# Patient Record
Sex: FEMALE | Race: WHITE | NOT HISPANIC OR LATINO | Employment: FULL TIME | URBAN - METROPOLITAN AREA
[De-identification: names, ages, dates, MRNs, and addresses within clinical notes are randomized per-mention and may not be internally consistent; named-entity substitution may affect disease eponyms.]

---

## 2017-08-09 ENCOUNTER — HISTORICAL (OUTPATIENT)
Dept: RADIOLOGY | Facility: HOSPITAL | Age: 47
End: 2017-08-09

## 2017-08-09 LAB
ALBUMIN SERPL-MCNC: 3.6 GM/DL (ref 3.4–5)
ALP SERPL-CCNC: 71 UNIT/L (ref 46–116)
ALT SERPL-CCNC: 61 UNIT/L (ref 12–78)
AST SERPL-CCNC: 36 UNIT/L (ref 15–37)
BILIRUB SERPL-MCNC: 1 MG/DL (ref 0.2–1)
BILIRUBIN DIRECT+TOT PNL SERPL-MCNC: 0.19 MG/DL (ref 0–0.2)
BILIRUBIN DIRECT+TOT PNL SERPL-MCNC: 0.81 MG/DL (ref 0–0.8)
BUN SERPL-MCNC: 17 MG/DL (ref 7–18)
CALCIUM SERPL-MCNC: 8.8 MG/DL (ref 8.5–10.1)
CHLORIDE SERPL-SCNC: 105 MMOL/L (ref 98–107)
CHOLEST SERPL-MCNC: 190 MG/DL (ref 0–200)
CHOLEST/HDLC SERPL: 3.5 {RATIO} (ref 0–4)
CO2 SERPL-SCNC: 23.8 MMOL/L (ref 21–32)
CREAT SERPL-MCNC: 0.91 MG/DL (ref 0.6–1.3)
DEPRECATED CALCIDIOL+CALCIFEROL SERPL-MC: 43.53 NG/ML (ref 30–80)
ERYTHROCYTE [DISTWIDTH] IN BLOOD BY AUTOMATED COUNT: 14 % (ref 11.5–17)
EST. AVERAGE GLUCOSE BLD GHB EST-MCNC: 128 MG/DL
FT4I SERPL CALC-MCNC: 2.96
GLUCOSE SERPL-MCNC: 109 MG/DL (ref 74–106)
HBA1C MFR BLD: 6.1 % (ref 4.5–6.2)
HCT VFR BLD AUTO: 36.2 % (ref 37–47)
HDLC SERPL-MCNC: 54 MG/DL (ref 40–60)
HGB BLD-MCNC: 11.9 GM/DL (ref 12–16)
LDLC SERPL CALC-MCNC: 103 MG/DL (ref 0–129)
MCH RBC QN AUTO: 29.4 PG (ref 27–31)
MCHC RBC AUTO-ENTMCNC: 32.9 GM/DL (ref 33–36)
MCV RBC AUTO: 89.3 FL (ref 80–94)
PLATELET # BLD AUTO: 199 X10(3)/MCL (ref 130–400)
PMV BLD AUTO: 10.2 FL (ref 7.4–10.4)
POTASSIUM SERPL-SCNC: 4.3 MMOL/L (ref 3.5–5.1)
PROT SERPL-MCNC: 6.8 GM/DL (ref 6.4–8.2)
RBC # BLD AUTO: 4.05 X10(6)/MCL (ref 4.2–5.4)
SODIUM SERPL-SCNC: 139 MMOL/L (ref 136–145)
T3RU NFR SERPL: 34 % (ref 31–39)
T4 SERPL-MCNC: 8.7 MCG/DL (ref 4.7–13.3)
TRIGL SERPL-MCNC: 163 MG/DL
TSH SERPL-ACNC: 2.23 MIU/ML (ref 0.36–3.74)
VLDLC SERPL CALC-MCNC: 33 MG/DL
WBC # SPEC AUTO: 11.9 X10(3)/MCL (ref 4.5–11.5)

## 2018-08-18 ENCOUNTER — HISTORICAL (OUTPATIENT)
Dept: LAB | Facility: HOSPITAL | Age: 48
End: 2018-08-18

## 2018-08-18 LAB
ABS NEUT (OLG): 5.98 X10(3)/MCL (ref 2.1–9.2)
ALBUMIN SERPL-MCNC: 3.5 GM/DL (ref 3.4–5)
ALP SERPL-CCNC: 63 UNIT/L (ref 46–116)
ALT SERPL-CCNC: 103 UNIT/L (ref 12–78)
AST SERPL-CCNC: 72 UNIT/L (ref 15–37)
BASOPHILS # BLD AUTO: 0 X10(3)/MCL (ref 0–0.2)
BASOPHILS NFR BLD AUTO: 0 %
BILIRUB SERPL-MCNC: 0.9 MG/DL (ref 0.2–1)
BILIRUBIN DIRECT+TOT PNL SERPL-MCNC: 0.18 MG/DL (ref 0–0.2)
BILIRUBIN DIRECT+TOT PNL SERPL-MCNC: 0.72 MG/DL (ref 0–0.8)
BUN SERPL-MCNC: 14.3 MG/DL (ref 7–18)
CALCIUM SERPL-MCNC: 8.9 MG/DL (ref 8.5–10.1)
CHLORIDE SERPL-SCNC: 105 MMOL/L (ref 98–107)
CHOLEST SERPL-MCNC: 180 MG/DL (ref 0–200)
CHOLEST/HDLC SERPL: 3.3 {RATIO} (ref 0–4)
CO2 SERPL-SCNC: 22.8 MMOL/L (ref 21–32)
CREAT SERPL-MCNC: 0.79 MG/DL (ref 0.6–1.3)
CREAT/UREA NIT SERPL: 18
DEPRECATED CALCIDIOL+CALCIFEROL SERPL-MC: 62.82 NG/ML (ref 30–80)
EOSINOPHIL # BLD AUTO: 0.1 X10(3)/MCL (ref 0–0.9)
EOSINOPHIL NFR BLD AUTO: 1 %
ERYTHROCYTE [DISTWIDTH] IN BLOOD BY AUTOMATED COUNT: 14 % (ref 11.5–17)
EST. AVERAGE GLUCOSE BLD GHB EST-MCNC: 123 MG/DL
FT4I SERPL CALC-MCNC: 2.55
GLUCOSE SERPL-MCNC: 118 MG/DL (ref 74–106)
HBA1C MFR BLD: 5.9 % (ref 4.5–6.2)
HCT VFR BLD AUTO: 37.1 % (ref 37–47)
HDLC SERPL-MCNC: 55 MG/DL (ref 40–60)
HGB BLD-MCNC: 11.8 GM/DL (ref 12–16)
IMM GRANULOCYTES # BLD AUTO: 0.03 % (ref 0–0.02)
IMM GRANULOCYTES NFR BLD AUTO: 0.3 % (ref 0–0.43)
LDLC SERPL CALC-MCNC: 103 MG/DL (ref 0–129)
LYMPHOCYTES # BLD AUTO: 2.5 X10(3)/MCL (ref 0.6–4.6)
LYMPHOCYTES NFR BLD AUTO: 28 %
MCH RBC QN AUTO: 28.6 PG (ref 27–31)
MCHC RBC AUTO-ENTMCNC: 31.8 GM/DL (ref 33–36)
MCV RBC AUTO: 90 FL (ref 80–94)
MONOCYTES # BLD AUTO: 0.4 X10(3)/MCL (ref 0.1–1.3)
MONOCYTES NFR BLD AUTO: 4 %
NEUTROPHILS # BLD AUTO: 5.98 X10(3)/MCL (ref 1.4–7.9)
NEUTROPHILS NFR BLD AUTO: 66 %
PLATELET # BLD AUTO: 235 X10(3)/MCL (ref 130–400)
PMV BLD AUTO: 11.2 FL (ref 9.4–12.4)
POTASSIUM SERPL-SCNC: 4.5 MMOL/L (ref 3.5–5.1)
PROT SERPL-MCNC: 6.8 GM/DL (ref 6.4–8.2)
RBC # BLD AUTO: 4.12 X10(6)/MCL (ref 4.2–5.4)
SODIUM SERPL-SCNC: 140 MMOL/L (ref 136–145)
T3RU NFR SERPL: 29 % (ref 31–39)
T4 SERPL-MCNC: 8.8 MCG/DL (ref 4.7–13.3)
TRIGL SERPL-MCNC: 111 MG/DL
TSH SERPL-ACNC: 0.81 MIU/ML (ref 0.36–3.74)
VLDLC SERPL CALC-MCNC: 22 MG/DL
WBC # SPEC AUTO: 9 X10(3)/MCL (ref 4.5–11.5)

## 2018-08-27 ENCOUNTER — HISTORICAL (OUTPATIENT)
Dept: RADIOLOGY | Facility: HOSPITAL | Age: 48
End: 2018-08-27

## 2018-08-27 LAB
HAV IGM SERPL QL IA: NEGATIVE
HBV CORE IGM SERPL QL IA: NEGATIVE
HBV SURFACE AG SERPL QL IA: NEGATIVE
HCV AB SERPL QL IA: NEGATIVE
HEPATITIS PANEL INTERP: NORMAL

## 2018-09-10 ENCOUNTER — HISTORICAL (OUTPATIENT)
Dept: RADIOLOGY | Facility: HOSPITAL | Age: 48
End: 2018-09-10

## 2018-11-01 ENCOUNTER — HISTORICAL (OUTPATIENT)
Dept: LAB | Facility: HOSPITAL | Age: 48
End: 2018-11-01

## 2018-11-01 LAB
ALBUMIN SERPL-MCNC: 3.5 GM/DL (ref 3.4–5)
ALP SERPL-CCNC: 65 UNIT/L (ref 46–116)
ALT SERPL-CCNC: 75 UNIT/L (ref 12–78)
AST SERPL-CCNC: 46 UNIT/L (ref 15–37)
BILIRUB SERPL-MCNC: 0.9 MG/DL (ref 0.2–1)
BILIRUBIN DIRECT+TOT PNL SERPL-MCNC: 0.21 MG/DL (ref 0–0.2)
BILIRUBIN DIRECT+TOT PNL SERPL-MCNC: 0.69 MG/DL (ref 0–0.8)
PROT SERPL-MCNC: 6.7 GM/DL (ref 6.4–8.2)

## 2018-12-19 ENCOUNTER — HISTORICAL (OUTPATIENT)
Dept: ENDOSCOPY | Facility: HOSPITAL | Age: 48
End: 2018-12-19

## 2018-12-19 LAB — CRC RECOMMENDATION EXT: NORMAL

## 2019-08-31 ENCOUNTER — HISTORICAL (OUTPATIENT)
Dept: LAB | Facility: HOSPITAL | Age: 49
End: 2019-08-31

## 2019-08-31 LAB
ABS NEUT (OLG): 7.14 X10(3)/MCL (ref 2.1–9.2)
ALBUMIN SERPL-MCNC: 3.7 GM/DL (ref 3.4–5)
ALP SERPL-CCNC: 74 UNIT/L (ref 46–116)
ALT SERPL-CCNC: 73 UNIT/L (ref 12–78)
AST SERPL-CCNC: 51 UNIT/L (ref 15–37)
BASOPHILS # BLD AUTO: 0 X10(3)/MCL (ref 0–0.2)
BASOPHILS NFR BLD AUTO: 0 %
BILIRUB SERPL-MCNC: 1.2 MG/DL (ref 0.2–1)
BILIRUBIN DIRECT+TOT PNL SERPL-MCNC: 0.25 MG/DL (ref 0–0.2)
BILIRUBIN DIRECT+TOT PNL SERPL-MCNC: 0.95 MG/DL (ref 0–0.8)
BUN SERPL-MCNC: 13.7 MG/DL (ref 7–18)
CALCIUM SERPL-MCNC: 9.3 MG/DL (ref 8.5–10.1)
CHLORIDE SERPL-SCNC: 106 MMOL/L (ref 98–107)
CHOLEST SERPL-MCNC: 180 MG/DL (ref 0–200)
CHOLEST/HDLC SERPL: 3.3 {RATIO} (ref 0–4)
CO2 SERPL-SCNC: 28.7 MMOL/L (ref 21–32)
CREAT SERPL-MCNC: 0.86 MG/DL (ref 0.6–1.3)
CREAT/UREA NIT SERPL: 16
DEPRECATED CALCIDIOL+CALCIFEROL SERPL-MC: 84.83 NG/ML (ref 30–80)
EOSINOPHIL # BLD AUTO: 0.1 X10(3)/MCL (ref 0–0.9)
EOSINOPHIL NFR BLD AUTO: 1 %
ERYTHROCYTE [DISTWIDTH] IN BLOOD BY AUTOMATED COUNT: 13.8 % (ref 11.5–17)
EST. AVERAGE GLUCOSE BLD GHB EST-MCNC: 137 MG/DL
FT4I SERPL CALC-MCNC: 3.33
GLUCOSE SERPL-MCNC: 120 MG/DL (ref 74–106)
HBA1C MFR BLD: 6.4 % (ref 4.5–6.2)
HCT VFR BLD AUTO: 39.2 % (ref 37–47)
HDLC SERPL-MCNC: 54 MG/DL (ref 40–60)
HGB BLD-MCNC: 12.4 GM/DL (ref 12–16)
IMM GRANULOCYTES # BLD AUTO: 0.01 % (ref 0–0.02)
IMM GRANULOCYTES NFR BLD AUTO: 0.1 % (ref 0–0.43)
LDLC SERPL CALC-MCNC: 108 MG/DL (ref 0–129)
LYMPHOCYTES # BLD AUTO: 2.7 X10(3)/MCL (ref 0.6–4.6)
LYMPHOCYTES NFR BLD AUTO: 26 %
MCH RBC QN AUTO: 28.8 PG (ref 27–31)
MCHC RBC AUTO-ENTMCNC: 31.6 GM/DL (ref 33–36)
MCV RBC AUTO: 91.2 FL (ref 80–94)
MONOCYTES # BLD AUTO: 0.4 X10(3)/MCL (ref 0.1–1.3)
MONOCYTES NFR BLD AUTO: 4 %
NEUTROPHILS # BLD AUTO: 7.14 X10(3)/MCL (ref 1.4–7.9)
NEUTROPHILS NFR BLD AUTO: 69 %
PLATELET # BLD AUTO: 247 X10(3)/MCL (ref 130–400)
PMV BLD AUTO: 11.3 FL (ref 9.4–12.4)
POTASSIUM SERPL-SCNC: 4.7 MMOL/L (ref 3.5–5.1)
PROT SERPL-MCNC: 7.2 GM/DL (ref 6.4–8.2)
RBC # BLD AUTO: 4.3 X10(6)/MCL (ref 4.2–5.4)
SODIUM SERPL-SCNC: 142 MMOL/L (ref 136–145)
T3RU NFR SERPL: 34 % (ref 31–39)
T4 SERPL-MCNC: 9.8 MCG/DL (ref 4.7–13.3)
TRIGL SERPL-MCNC: 89 MG/DL
TSH SERPL-ACNC: 1.25 MIU/ML (ref 0.36–3.74)
VLDLC SERPL CALC-MCNC: 18 MG/DL
WBC # SPEC AUTO: 10.4 X10(3)/MCL (ref 4.5–11.5)

## 2019-09-13 ENCOUNTER — HISTORICAL (OUTPATIENT)
Dept: RADIOLOGY | Facility: HOSPITAL | Age: 49
End: 2019-09-13

## 2020-09-09 ENCOUNTER — HISTORICAL (OUTPATIENT)
Dept: LAB | Facility: HOSPITAL | Age: 50
End: 2020-09-09

## 2020-09-09 LAB
ABS NEUT (OLG): 7.35 X10(3)/MCL (ref 2.1–9.2)
ALBUMIN SERPL-MCNC: 3.8 GM/DL (ref 3.4–5)
ALP SERPL-CCNC: 61 UNIT/L (ref 46–116)
ALT SERPL-CCNC: 75 UNIT/L (ref 12–78)
AST SERPL-CCNC: 46 UNIT/L (ref 15–37)
BASOPHILS # BLD AUTO: 0 X10(3)/MCL (ref 0–0.2)
BASOPHILS NFR BLD AUTO: 0 %
BILIRUB SERPL-MCNC: 1 MG/DL (ref 0.2–1)
BILIRUBIN DIRECT+TOT PNL SERPL-MCNC: 0.28 MG/DL (ref 0–0.2)
BILIRUBIN DIRECT+TOT PNL SERPL-MCNC: 0.72 MG/DL (ref 0–0.8)
BUN SERPL-MCNC: 17.6 MG/DL (ref 7–18)
CALCIUM SERPL-MCNC: 9.7 MG/DL (ref 8.5–10.1)
CHLORIDE SERPL-SCNC: 106 MMOL/L (ref 98–107)
CHOLEST SERPL-MCNC: 190 MG/DL (ref 0–200)
CHOLEST/HDLC SERPL: 3.2 {RATIO} (ref 0–4)
CO2 SERPL-SCNC: 25.8 MMOL/L (ref 21–32)
CREAT SERPL-MCNC: 0.82 MG/DL (ref 0.6–1.3)
CREAT/UREA NIT SERPL: 21 MG/DL (ref 12–14)
DEPRECATED CALCIDIOL+CALCIFEROL SERPL-MC: 49.1 NG/ML (ref 6.6–49.9)
EOSINOPHIL # BLD AUTO: 0.2 X10(3)/MCL (ref 0–0.9)
EOSINOPHIL NFR BLD AUTO: 2 %
ERYTHROCYTE [DISTWIDTH] IN BLOOD BY AUTOMATED COUNT: 14.1 % (ref 11.5–17)
EST. AVERAGE GLUCOSE BLD GHB EST-MCNC: 140 MG/DL
FT4I SERPL CALC-MCNC: 2.76
GLUCOSE SERPL-MCNC: 134 MG/DL (ref 74–106)
HBA1C MFR BLD: 6.5 % (ref 4.5–6.2)
HCT VFR BLD AUTO: 39.4 % (ref 37–47)
HDLC SERPL-MCNC: 59 MG/DL (ref 40–60)
HGB BLD-MCNC: 12.5 GM/DL (ref 12–16)
IMM GRANULOCYTES # BLD AUTO: 0.02 % (ref 0–0.02)
IMM GRANULOCYTES NFR BLD AUTO: 0.2 % (ref 0–0.43)
LDLC SERPL CALC-MCNC: 109 MG/DL (ref 0–129)
LYMPHOCYTES # BLD AUTO: 2.7 X10(3)/MCL (ref 0.6–4.6)
LYMPHOCYTES NFR BLD AUTO: 25 %
MCH RBC QN AUTO: 29.2 PG (ref 27–31)
MCHC RBC AUTO-ENTMCNC: 31.7 GM/DL (ref 33–36)
MCV RBC AUTO: 92.1 FL (ref 80–94)
MONOCYTES # BLD AUTO: 0.5 X10(3)/MCL (ref 0.1–1.3)
MONOCYTES NFR BLD AUTO: 5 %
NEUTROPHILS # BLD AUTO: 7.35 X10(3)/MCL (ref 1.4–7.9)
NEUTROPHILS NFR BLD AUTO: 68 %
PLATELET # BLD AUTO: 234 X10(3)/MCL (ref 130–400)
PMV BLD AUTO: 12 FL (ref 9.4–12.4)
POTASSIUM SERPL-SCNC: 4.7 MMOL/L (ref 3.5–5.1)
PROT SERPL-MCNC: 7.1 GM/DL (ref 6.4–8.2)
RBC # BLD AUTO: 4.28 X10(6)/MCL (ref 4.2–5.4)
SODIUM SERPL-SCNC: 140 MMOL/L (ref 136–145)
T3RU NFR SERPL: 31 % (ref 31–39)
T4 SERPL-MCNC: 8.9 MCG/DL (ref 4.7–13.3)
TRIGL SERPL-MCNC: 111 MG/DL
TSH SERPL-ACNC: 1.81 MIU/ML (ref 0.36–3.74)
VLDLC SERPL CALC-MCNC: 22 MG/DL
WBC # SPEC AUTO: 10.8 X10(3)/MCL (ref 4.5–11.5)

## 2020-09-23 ENCOUNTER — HISTORICAL (OUTPATIENT)
Dept: RADIOLOGY | Facility: HOSPITAL | Age: 50
End: 2020-09-23

## 2020-10-08 LAB
PAP RECOMMENDATION EXT: NORMAL
PAP SMEAR: NORMAL

## 2021-10-04 LAB
PAP RECOMMENDATION EXT: NORMAL
PAP SMEAR: NORMAL

## 2021-10-06 ENCOUNTER — HISTORICAL (OUTPATIENT)
Dept: RADIOLOGY | Facility: HOSPITAL | Age: 51
End: 2021-10-06

## 2022-04-11 ENCOUNTER — HISTORICAL (OUTPATIENT)
Dept: ADMINISTRATIVE | Facility: HOSPITAL | Age: 52
End: 2022-04-11
Payer: COMMERCIAL

## 2022-04-24 VITALS
DIASTOLIC BLOOD PRESSURE: 86 MMHG | SYSTOLIC BLOOD PRESSURE: 140 MMHG | BODY MASS INDEX: 50.02 KG/M2 | WEIGHT: 293 LBS | HEIGHT: 64 IN

## 2022-04-30 NOTE — H&P
Patient:   Hortensia Elena            MRN: [id number]            FIN: [id number]               Age:   48 years     Sex:  Female     :  1970   Associated Diagnoses:   None   Author:   Chris Petersen MD      Basic Information   Source of history:  Self.       Chief Complaint   48-year-old female referred for evaluation for screening colonoscopy.  She denies any typical problems of diarrhea, constipation, abdominal pain, weight loss, rectal bleeding or other alarm symptoms.  She denies any family members with colon cancer colon polyps.  She tolerated the prep is ready for the procedure.      Review of Systems   Constitutional:  Negative.    Gastrointestinal:  Negative.    Musculoskeletal:  Negative.       Health Status   Allergies:    Allergic Reactions (Selected)  No Known Medication Allergies,    Allergies (1) Active Reaction  No Known Medication Allergies None Documented   Current medications:  (Selected)   Documented Medications  Documented  Benicar 20 mg oral tablet: 20 mg = 1 tab(s), Oral, Daily  Vitamin D 50,000 intl units oral capsule: 50,000 IntUnit = 1 cap(s), Oral, 2x/Wk, 0 Refill(s)  Zyrtec 10 mg oral tablet: 10 mg = 1 tab(s), Oral, Daily  pravastatin 20 mg oral tablet: 20 mg = 1 tab(s), Oral, At Bedtime   Problem list:    All Problems  Able to lie down / SNOMED CT 154938648 / Confirmed  STATES ABLE TO LIE FLAT ON BACK WITH NO SOB  Able to walk / SNOMED CT 303093029 / Confirmed  PATIENT STATES NOT ABLE TO WALK 2 BLOCKS BRISKLY DUE TO KNEE  CPAP (continuous positive airway pressure) ventilation weaning protocol / SNOMED CT 1181933657 / Confirmed  Hypercholesterolaemia / SNOMED CT 489839724 / Confirmed  Hyperlipidemia / SNOMED CT 69813811 / Confirmed  Hypertension / SNOMED CT 41402932 / Confirmed  SAW DR BARCLAY LAST WEEK WITH EKG,ULTRASOUND,ECHO  Sleep apnea / SNOMED CT 114615312 / Confirmed,    Active Problems (7)  Able to lie down   Able to walk   CPAP (continuous positive airway  pressure) ventilation weaning protocol   Hypercholesterolaemia   Hyperlipidemia   Hypertension   Sleep apnea       Histories   Past Medical History:    Active  Hypertension (30748136)  Comments:  12/20/2015 CST 12:37 Eloisa Giordano RN  SAW DR BARCLAY LAST WEEK WITH EKG,ULTRASOUND,ECHO  Hyperlipidemia (69982877)  Able to lie down (296484004)  Comments:  12/20/2015 CST 12:38 Eloisa Giordano RN  STATES ABLE TO LIE FLAT ON BACK WITH NO SOB  Able to walk (178724804)  Comments:  12/20/2015 CST 12:39 Eloisa Giordano RN  PATIENT STATES NOT ABLE TO WALK 2 BLOCKS BRISKLY DUE TO KNEE  Sleep apnea (153161982)  CPAP (continuous positive airway pressure) ventilation weaning protocol (9391311510)  Resolved  Subluxation of patella ([id number]):  Resolved.   Family History:    Heart disease  Mother  Stroke  Father  Hypertension.  Mother  Hyperlipidemia.  Mother  Ovarian cancer.  Grandmother  Glaucoma.  Mother     Procedure history:    Arthroscopy Knee (Right) on 12/22/2015 at 45 Years.  Comments:  12/22/2015 08:16 - Lejeune RN, Doralis Marie  auto-populated from documented surgical case  Cholecystectomy ([id number]).  Bilateral tubal ligation (696492594).  Ablation ([id number]).  Comments:  12/22/2015 06:06 - Clarisse Khna RN  uterus   Social History        Social & Psychosocial Habits    Alcohol  12/20/2015  Use: Current    Type: Beer    Comment: OCCASIONALLY - 12/20/2015 12:47 - Eloisa Gomez RN    Employment/School  12/20/2015  Status: Employed    Highest education: High school    Home/Environment  12/20/2015  Lives with: Spouse    Living situation: Home/Independent    Home equipment: CPAP/BiPAP    Family/Friends available to help: Yes    Nutrition/Health  12/20/2015  Type of diet: Regular    Substance Abuse  12/20/2015 Risk Assessment: Denies Substance Abuse    Tobacco  12/20/2015  Use: Former smoker    Started at age: 18.0 Years    Comment: QUIT 1 YEAR AGO - 12/20/2015 12:47  - Jason BOO, Eloisa LIRA    12/19/2018  Use: Former smoker    Patient Wants Consult For Cessation Counseling No.        Physical Examination   General:  Alert and oriented.    Respiratory:  Lungs are clear to auscultation.    Gastrointestinal:  Soft, Non-tender, Non-distended.       Vital Signs (last 24 hrs)_____  Last Charted___________  Resp Rate         17 br/min  (DEC 19 07:20)  SBP      H 173mmHg  (DEC 19 07:20)  DBP      84 mmHg  (DEC 19 07:20)  SpO2      97 %  (DEC 19 07:20)   Measurements from flowsheet : Measurements   12/19/2018 7:26 CST      Weight Dosing             137.9 kg                             Weight Measured and Calculated in Lbs     304.01 lb                             Height/Length Dosing      160 cm        Health Maintenance      Health Maintenance     Pending (in the next year)        OverDue           Diabetes Screening due  and every         Due            ADL Screening due  12/19/18  and every 1  year(s)           Alcohol Misuse Screening due  12/19/18  and every 1  year(s)           Colorectal Screening due  12/19/18  and every            Depression Screening due  12/19/18  and every            Hypertension Management-Education due  12/19/18  and every 1  year(s)           Influenza Vaccine due  12/19/18  and every            Smoking Cessation due  12/19/18  Variable frequency           Tetanus Vaccine due  12/19/18  and every 10  year(s)        Due In Future            Blood Pressure Screening not due until  04/17/19  and every 1  year(s)           Body Mass Index Check not due until  04/17/19  and every 1  year(s)           Hypertension Management-Blood Pressure not due until  04/17/19  and every 1  year(s)           Obesity Screening not due until  04/17/19  and every 1  year(s)           Hypertension Management-BMP not due until  11/01/19  and every 1  year(s)     Satisfied (in the past 1 year)        Satisfied            Blood Pressure Screening on  12/19/18.  Satisfied by Janell LPN,  Yady           Body Mass Index Check on  04/17/18.  Satisfied by Lisa Cao.           Breast Cancer Screening on  08/27/18.  Satisfied by Valorie Simms           Cervical Cancer Screening on  10/17/18.  Satisfied by Lori Christianson           Diabetes Screening on  08/18/18.  Satisfied by Grayson Wooten           Hypertension Management-Blood Pressure on  12/19/18.  Satisfied by Yady Maciel LPN           Influenza Vaccine on  12/19/18.  Satisfied by Yady Maciel LPN           Lipid Screening on  08/18/18.  Satisfied by Grayson Wooten           Obesity Screening on  04/17/18.  Satisfied by Lisa Cao.        Review / Management   Results review:     No qualifying data available.       Impression and Plan   Planning for screening colons were asked to follow

## 2022-05-09 RX ORDER — PRAVASTATIN SODIUM 40 MG/1
40 TABLET ORAL DAILY
COMMUNITY
Start: 2021-04-26

## 2022-05-09 RX ORDER — CHOLECALCIFEROL (VITAMIN D3) 25 MCG
1000 TABLET ORAL DAILY
COMMUNITY
End: 2023-05-09

## 2022-05-09 RX ORDER — MELOXICAM 15 MG/1
15 TABLET ORAL DAILY
COMMUNITY
Start: 2021-04-26

## 2022-05-09 RX ORDER — LOSARTAN POTASSIUM 50 MG/1
50 TABLET ORAL DAILY
COMMUNITY
Start: 2021-04-26 | End: 2022-05-10

## 2022-05-09 RX ORDER — CETIRIZINE HYDROCHLORIDE 10 MG/1
10 TABLET ORAL DAILY
COMMUNITY

## 2022-05-10 ENCOUNTER — OFFICE VISIT (OUTPATIENT)
Dept: NEUROLOGY | Facility: CLINIC | Age: 52
End: 2022-05-10
Payer: COMMERCIAL

## 2022-05-10 VITALS
HEIGHT: 64 IN | BODY MASS INDEX: 50.02 KG/M2 | SYSTOLIC BLOOD PRESSURE: 124 MMHG | WEIGHT: 293 LBS | DIASTOLIC BLOOD PRESSURE: 80 MMHG

## 2022-05-10 DIAGNOSIS — G47.33 OSA ON CPAP: Primary | ICD-10-CM

## 2022-05-10 PROCEDURE — 99999 PR PBB SHADOW E&M-EST. PATIENT-LVL III: CPT | Mod: PBBFAC,,, | Performed by: NURSE PRACTITIONER

## 2022-05-10 PROCEDURE — 99999 PR PBB SHADOW E&M-EST. PATIENT-LVL III: ICD-10-PCS | Mod: PBBFAC,,, | Performed by: NURSE PRACTITIONER

## 2022-05-10 PROCEDURE — 99213 OFFICE O/P EST LOW 20 MIN: CPT | Mod: S$GLB,,, | Performed by: NURSE PRACTITIONER

## 2022-05-10 PROCEDURE — 99213 PR OFFICE/OUTPT VISIT, EST, LEVL III, 20-29 MIN: ICD-10-PCS | Mod: S$GLB,,, | Performed by: NURSE PRACTITIONER

## 2022-05-10 RX ORDER — LOSARTAN POTASSIUM 100 MG/1
100 TABLET ORAL DAILY
COMMUNITY
Start: 2022-04-30

## 2022-05-10 NOTE — PROGRESS NOTES
Subjective:       Patient ID: Hortensia Elena is a 51 y.o. female.    Chief Complaint: f/u jose l (Here for jose l f/u/../Pt reports nightly usage of pap machine; pap therapy beneficial for sleep. Refreshed awakenings; denies eds. Tolerating mask/pressure well. Needs supply order; dme is viemed. Has registered machine for replacement but not received. )    52 y/o female - annual FU for JOSE L      Pt reports nightly usage of pap machine; pap therapy beneficial for sleep. Refreshed awakenings; denies eds. Tolerating mask/pressure well. Needs supply order; dme is viemed. Has registered machine for replacement but not received.     PAP data:  date range 04/09/2022-05/08/2022  days used >=4hr 29/30  97%  CPAP 12 cm           Review of Systems   Constitutional: Negative for fatigue.        EPSS 3         Objective:      Physical Exam  Vitals reviewed.   Constitutional:       General: She is awake.      Appearance: Normal appearance. She is well-developed.   Eyes:      General: Lids are normal.      Extraocular Movements: Extraocular movements intact.   Cardiovascular:      Rate and Rhythm: Normal rate and regular rhythm.   Pulmonary:      Effort: Pulmonary effort is normal.      Breath sounds: Normal breath sounds.   Neurological:      General: No focal deficit present.      Mental Status: She is alert and oriented to person, place, and time.      Cranial Nerves: Cranial nerves are intact.      Motor: Motor function is intact.      Gait: Gait is intact.   Psychiatric:         Attention and Perception: Attention and perception normal.         Speech: Speech normal.         Behavior: Behavior is cooperative.         Cognition and Memory: Cognition and memory normal.         Judgment: Judgment normal.         Assessment:       Problem List Items Addressed This Visit    None     Visit Diagnoses     JOSE L on CPAP    -  Primary          Plan:           JOSE L:  Encouraged continued use of PAP. Drowsy driving may still occur despite PAP  use. Clinical follow up and replacement of supplies discussed.    FU in 1 year

## 2022-07-22 ENCOUNTER — PATIENT OUTREACH (OUTPATIENT)
Dept: ADMINISTRATIVE | Facility: HOSPITAL | Age: 52
End: 2022-07-22
Payer: COMMERCIAL

## 2022-07-22 NOTE — PROGRESS NOTES
Population Health Outreach. The following record(s)  below were uploaded for Health Maintenance .               10.08.2020 , 10.04.2021 PAP SMEAR      12.19.2018 COLONOSCOPY

## 2022-09-28 DIAGNOSIS — Z12.31 ENCOUNTER FOR SCREENING MAMMOGRAM FOR MALIGNANT NEOPLASM OF BREAST: Primary | ICD-10-CM

## 2022-10-10 ENCOUNTER — HOSPITAL ENCOUNTER (OUTPATIENT)
Dept: RADIOLOGY | Facility: HOSPITAL | Age: 52
Discharge: HOME | End: 2022-10-10
Attending: OBSTETRICS & GYNECOLOGY
Payer: COMMERCIAL

## 2022-10-10 DIAGNOSIS — Z12.31 ENCOUNTER FOR SCREENING MAMMOGRAM FOR MALIGNANT NEOPLASM OF BREAST: ICD-10-CM

## 2022-10-10 PROCEDURE — 77063 MAMMO DIGITAL SCREENING BILAT WITH TOMO: ICD-10-PCS | Mod: 26,,, | Performed by: STUDENT IN AN ORGANIZED HEALTH CARE EDUCATION/TRAINING PROGRAM

## 2022-10-10 PROCEDURE — 77067 SCR MAMMO BI INCL CAD: CPT | Mod: 26,,, | Performed by: STUDENT IN AN ORGANIZED HEALTH CARE EDUCATION/TRAINING PROGRAM

## 2022-10-10 PROCEDURE — 77067 MAMMO DIGITAL SCREENING BILAT WITH TOMO: ICD-10-PCS | Mod: 26,,, | Performed by: STUDENT IN AN ORGANIZED HEALTH CARE EDUCATION/TRAINING PROGRAM

## 2022-10-10 PROCEDURE — 77063 BREAST TOMOSYNTHESIS BI: CPT | Mod: 26,,, | Performed by: STUDENT IN AN ORGANIZED HEALTH CARE EDUCATION/TRAINING PROGRAM

## 2022-10-10 PROCEDURE — 77067 SCR MAMMO BI INCL CAD: CPT | Mod: TC

## 2023-01-31 ENCOUNTER — HOSPITAL ENCOUNTER (OUTPATIENT)
Facility: HOSPITAL | Age: 53
Discharge: HOME | End: 2023-01-31
Attending: INTERNAL MEDICINE | Admitting: INTERNAL MEDICINE
Payer: COMMERCIAL

## 2023-01-31 ENCOUNTER — ANESTHESIA EVENT (OUTPATIENT)
Dept: ENDOSCOPY | Facility: HOSPITAL | Age: 53
End: 2023-01-31
Payer: COMMERCIAL

## 2023-01-31 ENCOUNTER — ANESTHESIA (OUTPATIENT)
Dept: ENDOSCOPY | Facility: HOSPITAL | Age: 53
End: 2023-01-31
Payer: COMMERCIAL

## 2023-01-31 VITALS
OXYGEN SATURATION: 94 % | HEIGHT: 64 IN | WEIGHT: 293 LBS | RESPIRATION RATE: 21 BRPM | BODY MASS INDEX: 50.02 KG/M2 | TEMPERATURE: 98 F | DIASTOLIC BLOOD PRESSURE: 71 MMHG | SYSTOLIC BLOOD PRESSURE: 117 MMHG | HEART RATE: 67 BPM

## 2023-01-31 DIAGNOSIS — Z86.010 PERSONAL HISTORY OF COLONIC POLYPS: Primary | ICD-10-CM

## 2023-01-31 PROBLEM — Z86.0100 PERSONAL HISTORY OF COLONIC POLYPS: Status: ACTIVE | Noted: 2023-01-31

## 2023-01-31 PROCEDURE — 37000008 HC ANESTHESIA 1ST 15 MINUTES: Performed by: INTERNAL MEDICINE

## 2023-01-31 PROCEDURE — 37000009 HC ANESTHESIA EA ADD 15 MINS: Performed by: INTERNAL MEDICINE

## 2023-01-31 PROCEDURE — G0105 COLORECTAL SCRN; HI RISK IND: ICD-10-PCS | Mod: ,,, | Performed by: INTERNAL MEDICINE

## 2023-01-31 PROCEDURE — 25000003 PHARM REV CODE 250: Performed by: NURSE ANESTHETIST, CERTIFIED REGISTERED

## 2023-01-31 PROCEDURE — G0105 COLORECTAL SCRN; HI RISK IND: HCPCS | Performed by: INTERNAL MEDICINE

## 2023-01-31 PROCEDURE — G0105 COLORECTAL SCRN; HI RISK IND: HCPCS | Mod: ,,, | Performed by: INTERNAL MEDICINE

## 2023-01-31 PROCEDURE — 63600175 PHARM REV CODE 636 W HCPCS: Performed by: NURSE ANESTHETIST, CERTIFIED REGISTERED

## 2023-01-31 RX ORDER — LIDOCAINE HYDROCHLORIDE 20 MG/ML
INJECTION INTRAVENOUS
Status: DISCONTINUED | OUTPATIENT
Start: 2023-01-31 | End: 2023-01-31

## 2023-01-31 RX ORDER — PROPOFOL 10 MG/ML
VIAL (ML) INTRAVENOUS
Status: COMPLETED
Start: 2023-01-31 | End: 2023-01-31

## 2023-01-31 RX ORDER — LIDOCAINE HYDROCHLORIDE 20 MG/ML
INJECTION, SOLUTION EPIDURAL; INFILTRATION; INTRACAUDAL; PERINEURAL
Status: DISCONTINUED
Start: 2023-01-31 | End: 2023-01-31 | Stop reason: HOSPADM

## 2023-01-31 RX ORDER — GLYCOPYRROLATE 0.2 MG/ML
INJECTION INTRAMUSCULAR; INTRAVENOUS
Status: DISCONTINUED | OUTPATIENT
Start: 2023-01-31 | End: 2023-01-31

## 2023-01-31 RX ORDER — PROPOFOL 10 MG/ML
VIAL (ML) INTRAVENOUS
Status: DISCONTINUED | OUTPATIENT
Start: 2023-01-31 | End: 2023-01-31

## 2023-01-31 RX ORDER — SODIUM CHLORIDE, SODIUM GLUCONATE, SODIUM ACETATE, POTASSIUM CHLORIDE AND MAGNESIUM CHLORIDE 30; 37; 368; 526; 502 MG/100ML; MG/100ML; MG/100ML; MG/100ML; MG/100ML
INJECTION, SOLUTION INTRAVENOUS CONTINUOUS
Status: CANCELLED | OUTPATIENT
Start: 2023-01-31 | End: 2023-03-02

## 2023-01-31 RX ORDER — GLYCOPYRROLATE 0.2 MG/ML
INJECTION INTRAMUSCULAR; INTRAVENOUS
Status: COMPLETED
Start: 2023-01-31 | End: 2023-01-31

## 2023-01-31 RX ORDER — KETAMINE HYDROCHLORIDE 100 MG/ML
INJECTION, SOLUTION INTRAMUSCULAR; INTRAVENOUS
Status: DISCONTINUED | OUTPATIENT
Start: 2023-01-31 | End: 2023-01-31

## 2023-01-31 RX ORDER — LISINOPRIL 40 MG/1
50 TABLET ORAL DAILY
COMMUNITY

## 2023-01-31 RX ORDER — LIDOCAINE HYDROCHLORIDE 10 MG/ML
1 INJECTION, SOLUTION EPIDURAL; INFILTRATION; INTRACAUDAL; PERINEURAL ONCE
Status: CANCELLED | OUTPATIENT
Start: 2023-01-31 | End: 2023-01-31

## 2023-01-31 RX ADMIN — PROPOFOL 30 MG: 10 INJECTION, EMULSION INTRAVENOUS at 07:01

## 2023-01-31 RX ADMIN — PROPOFOL 10 MG: 10 INJECTION, EMULSION INTRAVENOUS at 07:01

## 2023-01-31 RX ADMIN — KETAMINE HYDROCHLORIDE 5 MG: 100 INJECTION INTRAMUSCULAR; INTRAVENOUS at 07:01

## 2023-01-31 RX ADMIN — GLYCOPYRROLATE 0.1 MG: 0.2 INJECTION INTRAMUSCULAR; INTRAVENOUS at 07:01

## 2023-01-31 RX ADMIN — KETAMINE HYDROCHLORIDE 25 MG: 100 INJECTION INTRAMUSCULAR; INTRAVENOUS at 07:01

## 2023-01-31 RX ADMIN — SODIUM CHLORIDE, SODIUM GLUCONATE, SODIUM ACETATE, POTASSIUM CHLORIDE AND MAGNESIUM CHLORIDE: 526; 502; 368; 37; 30 INJECTION, SOLUTION INTRAVENOUS at 07:01

## 2023-01-31 RX ADMIN — LIDOCAINE HYDROCHLORIDE 100 MG: 20 INJECTION, SOLUTION INTRAVENOUS at 07:01

## 2023-01-31 NOTE — TRANSFER OF CARE
"Anesthesia Transfer of Care Note    Patient: Hortensia Elena    Procedure(s) Performed: Procedure(s) (LRB):  COLON (N/A)    Patient location: GI    Anesthesia Type: general    Transport from OR: Transported from OR on room air with adequate spontaneous ventilation    Post pain: adequate analgesia    Post assessment: no apparent anesthetic complications and tolerated procedure well    Post vital signs: stable    Level of consciousness: awake, alert and oriented    Nausea/Vomiting: no nausea/vomiting    Complications: none    Transfer of care protocol was followed      Last vitals:   Visit Vitals  /61 (BP Location: Left arm, Patient Position: Lying)   Pulse 77   Temp 36.3 °C (97.3 °F) (Temporal)   Resp 17   Ht 5' 4" (1.626 m)   Wt (!) 143.8 kg (317 lb)   SpO2 95%   BMI 54.41 kg/m²     "

## 2023-01-31 NOTE — ANESTHESIA PREPROCEDURE EVALUATION
"                                                                                                             01/31/2023  Hortensia Elena is a 52 y.o.,morbidly Obese female who presents with h/o Colon Polyps(3yrs ago).  Diagnosis:   Personal history of colonic polyps [Z86.010]    She returns to [facility] OR for the noted procedure below..  Procedure:   COLON (Abdomen)      PMHx:  Other Medical History   Hypercholesteremia Sleep apnea   Hypertension Personal history of colonic polyps   Morbid Obesity      Surgical History:  POLYPECTOMY TUBAL LIGATION   CHOLECYSTECTOMY KNEE ARTHROSCOPY   COLONOSCOPY            Vital signs:  Pre Vitals     Current as of 01/31/23 0703  No BP, pulse, respiration, SpO2, or temperature recorded.  Height: 5' 4" (1.626 m) (01/30/23) Weight: 143.8 kg (317 lb) (01/30/23)   BMI: 54.4 IBW: 54.7 kg (120 lb 10.7 oz)           Lab Data:  N/A    EKG:    Pre-op Assessment    I have reviewed the Patient Summary Reports.     I have reviewed the Nursing Notes. I have reviewed the NPO Status.   I have reviewed the Medications.     Review of Systems  Anesthesia Hx:  No problems with previous Anesthesia    Social:  Non-Smoker    Hematology/Oncology:  Hematology Normal   Oncology Normal     EENT/Dental:EENT/Dental Normal   Cardiovascular:   Exercise tolerance: good Hypertension  Functional Capacity good / => 4 METS    Pulmonary:   Sleep Apnea    Renal/:  Renal/ Normal     Hepatic/GI:  Hepatic/GI Normal    Musculoskeletal:  Musculoskeletal Normal    Neurological:  Neurology Normal    Endocrine:  Endocrine Normal    Dermatological:  Skin Normal    Psych:  Psychiatric Normal           Physical Exam  General: Alert, Oriented, Well nourished and Cooperative    Airway:  Mallampati: II   Mouth Opening: Normal  TM Distance: Normal  Tongue: Normal  Neck ROM: Normal ROM    Dental:  Intact    Chest/Lungs:  Clear to auscultation, Normal Respiratory Rate    Heart:  Rate: Normal  Rhythm: Regular " Rhythm        Anesthesia Plan  Type of Anesthesia, risks & benefits discussed:    Anesthesia Type: Gen Natural Airway  Intra-op Monitoring Plan: Standard ASA Monitors  Induction:  IV  Informed Consent: Informed consent signed with the Patient and all parties understand the risks and agree with anesthesia plan.  All questions answered.   ASA Score: 3  Day of Surgery Review of History & Physical: H&P Update referred to the surgeon/provider.    Ready For Surgery From Anesthesia Perspective.     .

## 2023-01-31 NOTE — H&P
"[facility]    CC: Screening    HPI 52 y.o. female here for colorectal cancer screening. No current complaints. History of colon polyps    Past Medical History  Past Medical History:   Diagnosis Date    Hypercholesteremia     Hypertension     Personal history of colonic polyps 12/19/2018    Sleep apnea          Review of Systems  General ROS: negative for chills, fever or weight loss  Cardiovascular ROS: no chest pain or dyspnea on exertion  Gastrointestinal ROS: no abdominal pain, change in bowel habits, or black/ bloody stools    Physical Examination  /69 (BP Location: Right arm, Patient Position: Lying)   Pulse 70   Temp 97.3 °F (36.3 °C) (Temporal)   Resp 19   Ht 5' 4" (1.626 m)   Wt (!) 143.8 kg (317 lb)   SpO2 (!) 94%   BMI 54.41 kg/m²   General appearance: alert, cooperative, no distress  HENT: Normocephalic, atraumatic, neck symmetrical, no nasal discharge   Lungs: clear to auscultation bilaterally, no dullness to percussion bilaterally  Heart: regular rate and rhythm without rub; no displacement of the PMI   Abdomen: soft, non-tender; bowel sounds normoactive; no organomegaly  Extremities: extremities symmetric; no clubbing, cyanosis, or edema  Neurologic: Alert and oriented X 3, normal strength, normal coordination and gait    Labs:    Imaging:    I have personally reviewed and interpreted these images.    Assessment:   52 y.o. female here for colorectal cancer screening.  Risk of bleeding and perforation explained to patient.      Plan:  Colonoscopy      DARIN Meyer Jr., M.D.  [facility]    "

## 2023-01-31 NOTE — PROVATION PATIENT INSTRUCTIONS
Discharge Summary/Instructions after an Endoscopic Procedure  Patient Name: Hortensia Elena  Patient MRN: [id number]  Patient YOB: 1970  Tuesday, January 31, 2023  Loi Meyer MD  Dear patient,  As a result of recent federal legislation (The Federal Cures Act), you may   receive lab or pathology results from your procedure in your [facility]   account before your physician is able to contact you. Your physician or   their representative will relay the results to you with their   recommendations at their soonest availability.  Thank you,  RESTRICTIONS:  During your procedure today, you received medications for sedation.  These   medications may affect your judgment, balance and coordination.  Therefore,   for 24 hours, you have the following restrictions:   - DO NOT drive a car, operate machinery, make legal/financial decisions,   sign important papers or drink alcohol.    ACTIVITY:  Today: no heavy lifting, straining or running due to procedural   sedation/anesthesia.  The following day: return to full activity including work.  DIET:  Eat and drink normally unless instructed otherwise.     TREATMENT FOR COMMON SIDE EFFECTS:  - Mild abdominal pain, nausea, belching, bloating or excessive gas:  rest,   eat lightly and use a heating pad.  - Sore Throat: treat with throat lozenges and/or gargle with warm salt   water.  - Because air was used during the procedure, expelling large amounts of air   from your rectum or belching is normal.  - If a bowel prep was taken, you may not have a bowel movement for 1-3 days.    This is normal.  SYMPTOMS TO WATCH FOR AND REPORT TO YOUR PHYSICIAN:  1. Abdominal pain or bloating, other than gas cramps.  2. Chest pain.  3. Back pain.  4. Signs of infection such as: chills or fever occurring within 24 hours   after the procedure.  5. Rectal bleeding, which would show as bright red, maroon, or black stools.   (A tablespoon of blood from the rectum is not serious,  especially if   hemorrhoids are present.)  6. Vomiting.  7. Weakness or dizziness.  GO DIRECTLY TO THE NEAREST EMERGENCY ROOM IF YOU HAVE ANY OF THE FOLLOWING:      Difficulty breathing              Chills and/or fever over 101 F   Persistent vomiting and/or vomiting blood   Severe abdominal pain   Severe chest pain   Black, tarry stools   Bleeding- more than one tablespoon   Any other symptom or condition that you feel may need urgent attention  Your doctor recommends these additional instructions:  If any biopsies were taken, your doctors clinic will contact you in 1 to 2   weeks with any results.  - Discharge patient to home (ambulatory).   - Resume previous diet.   - Continue present medications.   - Repeat colonoscopy in 5 years for surveillance.  For questions, problems or results please call your physician - Loi Meyer MD at Work:  ( [phone].  [facility], EMERGENCY ROOM PHONE NUMBER: [phone]  IF A COMPLICATION OR EMERGENCY SITUATION ARISES AND YOU ARE UNABLE TO REACH   YOUR PHYSICIAN - GO DIRECTLY TO THE EMERGENCY ROOM.  Loi Meyer MD  1/31/2023 8:05:43 AM  This report has been verified and signed electronically.  Dear patient,  As a result of recent federal legislation (The Federal Cures Act), you may   receive lab or pathology results from your procedure in your [facility]   account before your physician is able to contact you. Your physician or   their representative will relay the results to you with their   recommendations at their soonest availability.  Thank you,  PROVATION

## 2023-01-31 NOTE — ANESTHESIA POSTPROCEDURE EVALUATION
Anesthesia Post Evaluation    Patient: Hortensia Elena    Procedure(s) Performed: Procedure(s) (LRB):  COLON (N/A)    Final Anesthesia Type: general      Patient location during evaluation: PACU  Patient participation: Yes- Able to Participate  Level of consciousness: awake and alert  Post-procedure vital signs: reviewed and stable  Pain management: adequate  Airway patency: patent    PONV status at discharge: No PONV, nausea (controlled) and vomiting (controlled)  Anesthetic complications: no      Cardiovascular status: blood pressure returned to baseline and stable  Respiratory status: unassisted  Hydration status: euvolemic  Follow-up not needed.          Vitals Value Taken Time   /71 01/31/23 0820   Temp 36.5 °C (97.7 °F) 01/31/23 0803   Pulse 67 01/31/23 0820   Resp 21 01/31/23 0820   SpO2 94 % 01/31/23 0820         No case tracking events are documented in the log.      Pain/Ravi Score: Ravi Score: 10 (1/31/2023  8:20 AM)

## 2023-05-09 ENCOUNTER — OFFICE VISIT (OUTPATIENT)
Dept: NEUROLOGY | Facility: CLINIC | Age: 53
End: 2023-05-09
Payer: COMMERCIAL

## 2023-05-09 VITALS
SYSTOLIC BLOOD PRESSURE: 118 MMHG | BODY MASS INDEX: 50.02 KG/M2 | DIASTOLIC BLOOD PRESSURE: 84 MMHG | WEIGHT: 293 LBS | HEIGHT: 64 IN

## 2023-05-09 DIAGNOSIS — G47.33 OSA ON CPAP: Primary | ICD-10-CM

## 2023-05-09 PROCEDURE — 99999 PR PBB SHADOW E&M-EST. PATIENT-LVL III: ICD-10-PCS | Mod: PBBFAC,,, | Performed by: NURSE PRACTITIONER

## 2023-05-09 PROCEDURE — 99999 PR PBB SHADOW E&M-EST. PATIENT-LVL III: CPT | Mod: PBBFAC,,, | Performed by: NURSE PRACTITIONER

## 2023-05-09 PROCEDURE — 99213 PR OFFICE/OUTPT VISIT, EST, LEVL III, 20-29 MIN: ICD-10-PCS | Mod: S$GLB,,, | Performed by: NURSE PRACTITIONER

## 2023-05-09 PROCEDURE — 99213 OFFICE O/P EST LOW 20 MIN: CPT | Mod: S$GLB,,, | Performed by: NURSE PRACTITIONER

## 2023-05-09 RX ORDER — TIRZEPATIDE 15 MG/.5ML
INJECTION, SOLUTION SUBCUTANEOUS
COMMUNITY
Start: 2023-05-03

## 2023-05-09 RX ORDER — HYDROCHLOROTHIAZIDE 25 MG/1
25 TABLET ORAL
COMMUNITY
Start: 2023-04-24

## 2023-05-09 RX ORDER — ERGOCALCIFEROL 1.25 MG/1
50000 CAPSULE ORAL
COMMUNITY
Start: 2023-04-24

## 2023-05-09 NOTE — PROGRESS NOTES
Neurology Follow up Note    Subjective:         Patient ID: Hortensia Elena is a 52 y.o. female.    Chief Complaint: 1 yr jose l f/u    HPI:            Pt reports nightly usage of pap machine; pap therapy beneficial for sleep. Refreshed awakenings, denies EDS. Tolerating mask/pressure well. Needs supply order; DME is viemed. No compliance to review today, pt forgot SD card.     EPWORTH SLEEPINESS SCALE 5/9/2023   Sitting and reading 1   Watching TV 1   Sitting, inactive in a public place (e.g. a theatre or a meeting) 0   As a passenger in a car for an hour without a break 2   Lying down to rest in the afternoon when circumstances permit 3   Sitting and talking to someone 0   Sitting quietly after a lunch without alcohol 0   In a car, while stopped for a few minutes in traffic 0   Total score 7           ROS: as per HPI, otherwise pertinent systems review is negative          Past Medical History:   Diagnosis Date    Hypercholesteremia     Hypertension     Personal history of colonic polyps 12/19/2018    Sleep apnea        Past Surgical History:   Procedure Laterality Date    CHOLECYSTECTOMY      COLONOSCOPY  12/19/2018    Chris Petersen MD    COLONOSCOPY N/A 1/31/2023    Procedure: COLON;  Surgeon: Loi Meyer MD;  Location: [facility] ENDOSCOPY;  Service: Gastroenterology;  Laterality: N/A;    KNEE ARTHROSCOPY      POLYPECTOMY      TUBAL LIGATION         Family History   Problem Relation Age of Onset    Glaucoma Mother     Heart disease Mother     Hyperlipidemia Mother     Hypertension Mother     Stroke Father        Social History     Socioeconomic History    Marital status:    Tobacco Use    Smoking status: Former    Smokeless tobacco: Never   Substance and Sexual Activity    Alcohol use: Yes     Comment: beer occasionally    Drug use: Never       Review of patient's allergies indicates:  No Known Allergies    Current Outpatient Medications   Medication Instructions    cetirizine (ZYRTEC) 10  "mg, Oral, Daily    ergocalciferol (ERGOCALCIFEROL) 50,000 Units, Oral, Every 7 days    hydroCHLOROthiazide (HYDRODIURIL) 25 mg, Oral    lisinopriL (PRINIVIL,ZESTRIL) 50 mg, Oral, Daily    losartan (COZAAR) 100 mg, Oral, Daily    meloxicam (MOBIC) 15 mg, Oral, Daily    MOUNJARO 15 mg/0.5 mL PnIj SMARTSI SUB-Q Once a Week    pravastatin (PRAVACHOL) 40 mg, Oral, Daily         Objective:      Exam:   /84 (BP Location: Left arm, Patient Position: Sitting)   Ht 5' 4" (1.626 m)   Wt 136.1 kg (300 lb)   BMI 51.49 kg/m²     Physical Exam  Vitals reviewed.   Constitutional:       Appearance: Normal appearance.      Accompanied by: alone   HENT:      Ears:      Comments: Hearing normal.  Eyes:      Extraocular Movements: Extraocular movements intact.   Cardiovascular:      Rate and Rhythm: Normal rate and regular rhythm.   Pulmonary:      Effort: Pulmonary effort is normal.      Breath sounds: Normal breath sounds.   Musculoskeletal:         General: Normal range of motion.   Skin:     General: Skin is warm and dry.   Neurological:      General: No focal deficit present.      Mental Status: she is alert and oriented to person, place, and time.      Gait unassisted and normal.  Psychiatric:         Mood and Affect: Mood normal.         Behavior: Behavior normal.         Assessment/Plan:     Problem List Items Addressed This Visit          Other    RICARDO on CPAP - Primary         Abdiaziz Patel, MSN, APRN, AGACNP-BC        "

## 2023-09-27 DIAGNOSIS — Z12.31 BREAST CANCER SCREENING BY MAMMOGRAM: Primary | ICD-10-CM

## 2023-10-13 ENCOUNTER — HOSPITAL ENCOUNTER (OUTPATIENT)
Dept: RADIOLOGY | Facility: HOSPITAL | Age: 53
Discharge: HOME | End: 2023-10-13
Attending: OBSTETRICS & GYNECOLOGY
Payer: COMMERCIAL

## 2023-10-13 DIAGNOSIS — Z12.31 BREAST CANCER SCREENING BY MAMMOGRAM: ICD-10-CM

## 2023-10-13 PROCEDURE — 77063 BREAST TOMOSYNTHESIS BI: CPT | Mod: 26,,, | Performed by: RADIOLOGY

## 2023-10-13 PROCEDURE — 77063 MAMMO DIGITAL SCREENING BILAT WITH TOMO: ICD-10-PCS | Mod: 26,,, | Performed by: RADIOLOGY

## 2023-10-13 PROCEDURE — 77067 SCR MAMMO BI INCL CAD: CPT | Mod: TC

## 2023-10-13 PROCEDURE — 77067 MAMMO DIGITAL SCREENING BILAT WITH TOMO: ICD-10-PCS | Mod: 26,,, | Performed by: RADIOLOGY

## 2023-10-13 PROCEDURE — 77067 SCR MAMMO BI INCL CAD: CPT | Mod: 26,,, | Performed by: RADIOLOGY

## 2023-11-06 ENCOUNTER — HOSPITAL ENCOUNTER (OUTPATIENT)
Dept: RADIOLOGY | Facility: HOSPITAL | Age: 53
Discharge: HOME | End: 2023-11-06
Attending: OBSTETRICS & GYNECOLOGY
Payer: COMMERCIAL

## 2023-11-06 DIAGNOSIS — R92.8 ABNORMAL SCREENING MAMMOGRAM: ICD-10-CM

## 2023-11-06 PROCEDURE — 77061 BREAST TOMOSYNTHESIS UNI: CPT | Mod: 26,RT,, | Performed by: RADIOLOGY

## 2023-11-06 PROCEDURE — 77065 DX MAMMO INCL CAD UNI: CPT | Mod: TC,RT

## 2023-11-06 PROCEDURE — 77065 MAMMO DIGITAL DIAGNOSTIC RIGHT WITH TOMO: ICD-10-PCS | Mod: 26,RT,, | Performed by: RADIOLOGY

## 2023-11-06 PROCEDURE — 77061 MAMMO DIGITAL DIAGNOSTIC RIGHT WITH TOMO: ICD-10-PCS | Mod: 26,RT,, | Performed by: RADIOLOGY

## 2023-11-06 PROCEDURE — 77065 DX MAMMO INCL CAD UNI: CPT | Mod: 26,RT,, | Performed by: RADIOLOGY

## 2024-05-14 ENCOUNTER — OFFICE VISIT (OUTPATIENT)
Dept: NEUROLOGY | Facility: CLINIC | Age: 54
End: 2024-05-14
Payer: COMMERCIAL

## 2024-05-14 ENCOUNTER — PATIENT MESSAGE (OUTPATIENT)
Dept: NEUROLOGY | Facility: CLINIC | Age: 54
End: 2024-05-14

## 2024-05-14 VITALS
SYSTOLIC BLOOD PRESSURE: 122 MMHG | HEIGHT: 64 IN | BODY MASS INDEX: 48.32 KG/M2 | WEIGHT: 283 LBS | DIASTOLIC BLOOD PRESSURE: 82 MMHG

## 2024-05-14 DIAGNOSIS — G47.33 OSA ON CPAP: Primary | ICD-10-CM

## 2024-05-14 PROCEDURE — 99213 OFFICE O/P EST LOW 20 MIN: CPT | Mod: S$GLB,,, | Performed by: SPECIALIST

## 2024-05-14 PROCEDURE — 99999 PR PBB SHADOW E&M-EST. PATIENT-LVL III: CPT | Mod: PBBFAC,,, | Performed by: SPECIALIST

## 2024-05-14 RX ORDER — TIRZEPATIDE 10 MG/.5ML
10 INJECTION, SOLUTION SUBCUTANEOUS
COMMUNITY

## 2024-05-14 RX ORDER — ROSUVASTATIN CALCIUM 40 MG/1
40 TABLET, COATED ORAL NIGHTLY
COMMUNITY
Start: 2024-04-18

## 2024-05-14 NOTE — PROGRESS NOTES
"Subjective:       Patient ID: Hortensia Elena is a 53 y.o. female.    Chief Complaint: sleep apnea follow up; or other____    HPI:            F/U RICARDO. (Wears CPAP qhs and is tolerating it well. Sleeps better w CPAP. Sleeps 8 hrs a night and sleeps all night. Awakens refreshed and denies EDS. Does not nap. Changes mask and tubing on a regular basis. Denies any issues w mask or equipment.DME_[employer]. Compliance-2024-2024 AHI-0.4  > 4 hrs 100%)      Review of Systems        2024     1:53 PM   EPWORTH SLEEPINESS SCALE   Sitting and reading 3   Watching TV 1   Sitting, inactive in a public place (e.g. a theatre or a meeting) 0   As a passenger in a car for an hour without a break 2   Lying down to rest in the afternoon when circumstances permit 0   Sitting and talking to someone 0   Sitting quietly after a lunch without alcohol 3   In a car, while stopped for a few minutes in traffic 0   Total score 9               Current Outpatient Medications   Medication Instructions    cetirizine (ZYRTEC) 10 mg, Oral, Daily    ergocalciferol (ERGOCALCIFEROL) 50,000 Units, Oral, Every 7 days    hydroCHLOROthiazide (HYDRODIURIL) 25 mg, Oral    lisinopriL (PRINIVIL,ZESTRIL) 50 mg, Oral, Daily    losartan (COZAAR) 100 mg, Oral, Daily    meloxicam (MOBIC) 15 mg, Oral, Daily    MOUNJARO 15 mg/0.5 mL PnIj SMARTSI SUB-Q Once a Week    MOUNJARO 10 mg, Subcutaneous, Every 7 days    rosuvastatin (CRESTOR) 40 mg, Oral, Nightly         Objective:        Exam:   /82   Ht 5' 4" (1.626 m)   Wt 128.4 kg (283 lb)   BMI 48.58 kg/m²   Body mass index is 48.58 kg/m².    General Exam  if accompanied, by__  body habitus_  mental status_alert and appropriate  oropharynx_Mallampati grade_  neck_  heart__rrr  Extremities_ no edema   skin_    Neurological    Speech __ ok  cranial nerves:  CN 2 VF_ok   CN 7_no lower face asymmetry  CN 12 tongue_ok  motor__  gait_ indep   Labs:      Lengthy discussion about the risks of untreated " moderate to severe obstructive sleep apnea (RICARDO).   Testing modalities/test options for sleep apnea discussed.  Potential need for treatment of RICARDO discussed including CPAP or Bilevel  PAP.   Alternatives to PAP discussed if PAP not ultimately tolerated.  Risks of drowsy driving discussed.  Weight loss discussed if patient is overweight.            Assessment/Plan:       Problem List Items Addressed This Visit          Other    RICARDO on CPAP - Primary           Other comments/ follow up:          Orders Placed This Encounter   Procedures    CPAP/BIPAP SUPPLIES        Aim virtual fu JT   No follow-ups on file.

## 2024-11-07 DIAGNOSIS — Z12.31 BREAST CANCER SCREENING BY MAMMOGRAM: Primary | ICD-10-CM

## 2024-11-22 ENCOUNTER — HOSPITAL ENCOUNTER (OUTPATIENT)
Dept: RADIOLOGY | Facility: HOSPITAL | Age: 54
Discharge: HOME | End: 2024-11-22
Attending: PHYSICIAN ASSISTANT
Payer: COMMERCIAL

## 2024-11-22 DIAGNOSIS — Z12.31 BREAST CANCER SCREENING BY MAMMOGRAM: ICD-10-CM

## 2024-11-22 PROCEDURE — 77063 BREAST TOMOSYNTHESIS BI: CPT | Mod: 26,,, | Performed by: RADIOLOGY

## 2024-11-22 PROCEDURE — 77067 SCR MAMMO BI INCL CAD: CPT | Mod: 26,,, | Performed by: RADIOLOGY

## 2024-11-22 PROCEDURE — 77063 BREAST TOMOSYNTHESIS BI: CPT | Mod: TC

## 2025-04-14 ENCOUNTER — HOSPITAL ENCOUNTER (EMERGENCY)
Facility: HOSPITAL | Age: 55
Discharge: HOME | End: 2025-04-14
Attending: STUDENT IN AN ORGANIZED HEALTH CARE EDUCATION/TRAINING PROGRAM
Payer: OTHER MISCELLANEOUS

## 2025-04-14 VITALS
WEIGHT: 285 LBS | HEART RATE: 76 BPM | SYSTOLIC BLOOD PRESSURE: 131 MMHG | DIASTOLIC BLOOD PRESSURE: 68 MMHG | BODY MASS INDEX: 48.65 KG/M2 | TEMPERATURE: 98 F | RESPIRATION RATE: 20 BRPM | HEIGHT: 64 IN | OXYGEN SATURATION: 97 %

## 2025-04-14 DIAGNOSIS — M25.519 SHOULDER PAIN: ICD-10-CM
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
## 2025-04-14 DIAGNOSIS — S43.004A DISLOCATION OF RIGHT SHOULDER JOINT, INITIAL ENCOUNTER: Primary | ICD-10-CM

## 2025-04-14 LAB
ALBUMIN SERPL-MCNC: 4 G/DL (ref 3.5–5)
ALBUMIN/GLOB SERPL: 1.2 RATIO (ref 1.1–2)
ALP SERPL-CCNC: 66 UNIT/L (ref 40–150)
ALT SERPL-CCNC: 17 UNIT/L (ref 0–55)
ANION GAP SERPL CALC-SCNC: 12 MEQ/L
AST SERPL-CCNC: 18 UNIT/L (ref 11–45)
BASOPHILS # BLD AUTO: 0.03 X10(3)/MCL
BASOPHILS NFR BLD AUTO: 0.3 %
BILIRUB SERPL-MCNC: 1.5 MG/DL
BUN SERPL-MCNC: 28.9 MG/DL (ref 9.8–20.1)
CALCIUM SERPL-MCNC: 9.2 MG/DL (ref 8.4–10.2)
CHLORIDE SERPL-SCNC: 108 MMOL/L (ref 98–107)
CO2 SERPL-SCNC: 17 MMOL/L (ref 22–29)
CREAT SERPL-MCNC: 0.92 MG/DL (ref 0.55–1.02)
CREAT/UREA NIT SERPL: 31
EOSINOPHIL # BLD AUTO: 0.04 X10(3)/MCL (ref 0–0.9)
EOSINOPHIL NFR BLD AUTO: 0.4 %
ERYTHROCYTE [DISTWIDTH] IN BLOOD BY AUTOMATED COUNT: 13.8 % (ref 11.5–17)
GFR SERPLBLD CREATININE-BSD FMLA CKD-EPI: >60 ML/MIN/1.73/M2
GLOBULIN SER-MCNC: 3.3 GM/DL (ref 2.4–3.5)
GLUCOSE SERPL-MCNC: 121 MG/DL (ref 74–100)
HCT VFR BLD AUTO: 39.8 % (ref 37–47)
HGB BLD-MCNC: 12.9 G/DL (ref 12–16)
IMM GRANULOCYTES # BLD AUTO: 0.04 X10(3)/MCL (ref 0–0.04)
IMM GRANULOCYTES NFR BLD AUTO: 0.4 %
LYMPHOCYTES # BLD AUTO: 2.05 X10(3)/MCL (ref 0.6–4.6)
LYMPHOCYTES NFR BLD AUTO: 18.6 %
MCH RBC QN AUTO: 28.3 PG (ref 27–31)
MCHC RBC AUTO-ENTMCNC: 32.4 G/DL (ref 33–36)
MCV RBC AUTO: 87.3 FL (ref 80–94)
MONOCYTES # BLD AUTO: 0.45 X10(3)/MCL (ref 0.1–1.3)
MONOCYTES NFR BLD AUTO: 4.1 %
NEUTROPHILS # BLD AUTO: 8.4 X10(3)/MCL (ref 2.1–9.2)
NEUTROPHILS NFR BLD AUTO: 76.2 %
NRBC BLD AUTO-RTO: 0 %
PLATELET # BLD AUTO: 203 X10(3)/MCL (ref 130–400)
PMV BLD AUTO: 12.1 FL (ref 7.4–10.4)
POTASSIUM SERPL-SCNC: 3.8 MMOL/L (ref 3.5–5.1)
PROT SERPL-MCNC: 7.3 GM/DL (ref 6.4–8.3)
RBC # BLD AUTO: 4.56 X10(6)/MCL (ref 4.2–5.4)
SODIUM SERPL-SCNC: 137 MMOL/L (ref 136–145)
WBC # BLD AUTO: 11.01 X10(3)/MCL (ref 4.5–11.5)

## 2025-04-14 PROCEDURE — 23650 CLTX SHO DSLC W/MNPJ WO ANES: CPT | Mod: RT

## 2025-04-14 PROCEDURE — 96375 TX/PRO/DX INJ NEW DRUG ADDON: CPT

## 2025-04-14 PROCEDURE — 85025 COMPLETE CBC W/AUTO DIFF WBC: CPT | Performed by: STUDENT IN AN ORGANIZED HEALTH CARE EDUCATION/TRAINING PROGRAM

## 2025-04-14 PROCEDURE — 99152 MOD SED SAME PHYS/QHP 5/>YRS: CPT

## 2025-04-14 PROCEDURE — 96374 THER/PROPH/DIAG INJ IV PUSH: CPT

## 2025-04-14 PROCEDURE — 63600175 PHARM REV CODE 636 W HCPCS: Performed by: STUDENT IN AN ORGANIZED HEALTH CARE EDUCATION/TRAINING PROGRAM

## 2025-04-14 PROCEDURE — 80053 COMPREHEN METABOLIC PANEL: CPT | Performed by: STUDENT IN AN ORGANIZED HEALTH CARE EDUCATION/TRAINING PROGRAM

## 2025-04-14 PROCEDURE — 99285 EMERGENCY DEPT VISIT HI MDM: CPT | Mod: 25

## 2025-04-14 RX ORDER — PROPOFOL 10 MG/ML
1 VIAL (ML) INTRAVENOUS
Status: DISCONTINUED | OUTPATIENT
Start: 2025-04-14 | End: 2025-04-14 | Stop reason: HOSPADM

## 2025-04-14 RX ORDER — METHOCARBAMOL 500 MG/1
1000 TABLET, FILM COATED ORAL 3 TIMES DAILY
Qty: 30 TABLET | Refills: 0 | Status: SHIPPED | OUTPATIENT
Start: 2025-04-14 | End: 2025-04-19

## 2025-04-14 RX ORDER — PROPOFOL 10 MG/ML
0.5 VIAL (ML) INTRAVENOUS
Status: DISCONTINUED | OUTPATIENT
Start: 2025-04-14 | End: 2025-04-14 | Stop reason: HOSPADM

## 2025-04-14 RX ORDER — METHOCARBAMOL 100 MG/ML
1000 INJECTION, SOLUTION INTRAMUSCULAR; INTRAVENOUS ONCE
Status: COMPLETED | OUTPATIENT
Start: 2025-04-14 | End: 2025-04-14

## 2025-04-14 RX ORDER — IBUPROFEN 600 MG/1
600 TABLET ORAL EVERY 6 HOURS PRN
Qty: 20 TABLET | Refills: 0 | Status: SHIPPED | OUTPATIENT
Start: 2025-04-14

## 2025-04-14 RX ORDER — MORPHINE SULFATE 4 MG/ML
4 INJECTION, SOLUTION INTRAMUSCULAR; INTRAVENOUS
Refills: 0 | Status: COMPLETED | OUTPATIENT
Start: 2025-04-14 | End: 2025-04-14

## 2025-04-14 RX ORDER — ONDANSETRON 4 MG/1
4 TABLET, ORALLY DISINTEGRATING ORAL EVERY 6 HOURS PRN
Qty: 12 TABLET | Refills: 0 | Status: SHIPPED | OUTPATIENT
Start: 2025-04-14

## 2025-04-14 RX ORDER — ONDANSETRON HYDROCHLORIDE 2 MG/ML
4 INJECTION, SOLUTION INTRAVENOUS
Status: COMPLETED | OUTPATIENT
Start: 2025-04-14 | End: 2025-04-14

## 2025-04-14 RX ORDER — PROPOFOL 10 MG/ML
INJECTION, EMULSION INTRAVENOUS
Status: DISCONTINUED
Start: 2025-04-14 | End: 2025-04-14 | Stop reason: HOSPADM

## 2025-04-14 RX ORDER — PROPOFOL 10 MG/ML
VIAL (ML) INTRAVENOUS CODE/TRAUMA/SEDATION MEDICATION
Status: COMPLETED | OUTPATIENT
Start: 2025-04-14 | End: 2025-04-14

## 2025-04-14 RX ADMIN — PROPOFOL 100 MG: 10 INJECTION, EMULSION INTRAVENOUS at 12:04

## 2025-04-14 RX ADMIN — MORPHINE SULFATE 4 MG: 4 INJECTION INTRAVENOUS at 11:04

## 2025-04-14 RX ADMIN — METHOCARBAMOL 1000 MG: 100 INJECTION INTRAMUSCULAR; INTRAVENOUS at 11:04

## 2025-04-14 RX ADMIN — ONDANSETRON 4 MG: 2 INJECTION INTRAMUSCULAR; INTRAVENOUS at 11:04

## 2025-04-14 RX ADMIN — SODIUM CHLORIDE, POTASSIUM CHLORIDE, SODIUM LACTATE AND CALCIUM CHLORIDE 1000 ML: 600; 310; 30; 20 INJECTION, SOLUTION INTRAVENOUS at 10:04

## 2025-04-14 NOTE — DISCHARGE INSTRUCTIONS

## 2025-04-14 NOTE — ED NOTES
Assumed care of the patient at this time from EMS. Pt is awake and alert GCS 15. Her chief complaint is pain to her right shoulder. The patient is a  and reports she tripped and fell with her right arm outstretched trying to catch herself onto a table but it was unlocked and it slid away from her causing her to keep falling. She presents in a hasty sling and swathe while also holding her hand to provide support. Bilateral radial pulses palpable 2+. Denies numbness or tingling in distal right extremity. No discoloration noted. Gross motor in tact. Pt has decreased ROM in her right shoulder.

## 2025-04-14 NOTE — ED PROVIDER NOTES
Encounter Date: 4/14/2025    SCRIBE #1 NOTE: I, Arnulfo Grace, am scribing for, and in the presence of,  Stefan Moss MD. I have scribed the following portions of the note - Other sections scribed: HPI, ROS, and PE.       History     Chief Complaint   Patient presents with    Shoulder Pain     Pt arrives via [facility]. Pt endorses R shoulder pain after slip and fall at work. -LOC -BT -Head strike. Arrives with sling in place. Given 15 mg Toradol en route. GCS 15     Hortensia Elena is a 54 y.o. female patient with a PMHx of HLD and HTN who presents to the ED for evaluation of right shoulder pain following a fall pta. Pt states that she was moving tables when she turned to much and fell onto her right shoulder. She denies any LOC, neck pain, and back pain.       The history is provided by the patient. No  was used.     Review of patient's allergies indicates:  No Known Allergies  Past Medical History:   Diagnosis Date    Hypercholesteremia     Hypertension     Personal history of colonic polyps 12/19/2018    Sleep apnea      Past Surgical History:   Procedure Laterality Date    CHOLECYSTECTOMY      COLONOSCOPY  12/19/2018    Chris Petersen MD    COLONOSCOPY N/A 1/31/2023    Procedure: COLON;  Surgeon: Loi Meyer MD;  Location: [facility] ENDOSCOPY;  Service: Gastroenterology;  Laterality: N/A;    KNEE ARTHROSCOPY      POLYPECTOMY      TUBAL LIGATION       Family History   Problem Relation Name Age of Onset    Glaucoma Mother      Heart disease Mother      Hyperlipidemia Mother      Hypertension Mother      Stroke Father       Social History[1]  Review of Systems   Constitutional:  Negative for chills and fever.   HENT:  Negative for congestion, drooling and sore throat.    Eyes:  Negative for pain and visual disturbance.   Respiratory:  Negative for chest tightness, shortness of breath and wheezing.    Cardiovascular:  Negative for chest pain, palpitations and leg swelling.    Gastrointestinal:  Negative for abdominal pain, nausea and vomiting.   Genitourinary:  Negative for dysuria and hematuria.   Musculoskeletal:  Positive for myalgias (right shoulder). Negative for back pain, neck pain and neck stiffness.   Skin:  Negative for pallor and rash.   Neurological:  Negative for weakness and numbness.   Hematological:  Does not bruise/bleed easily.       Physical Exam     Initial Vitals [04/14/25 1029]   BP Pulse Resp Temp SpO2   119/87 80 17 98.2 °F (36.8 °C) 98 %      MAP       --         Physical Exam    Nursing note and vitals reviewed.  Constitutional: She appears well-developed and well-nourished. She is not diaphoretic. No distress.   Uncomfortable appearing.    HENT:   Head: Normocephalic and atraumatic.   Nose: Nose normal. Mouth/Throat: Oropharynx is clear and moist.   Eyes: EOM are normal. Pupils are equal, round, and reactive to light.   Neck: Neck supple.   Normal range of motion.  Cardiovascular:  Normal rate and regular rhythm.           No murmur heard.  Palpable pulses to all extremities.    Pulmonary/Chest: Breath sounds normal. No respiratory distress. She has no wheezes. She has no rales.   Abdominal: Abdomen is soft. She exhibits no distension. There is no abdominal tenderness.   Musculoskeletal:      Cervical back: Normal range of motion and neck supple.      Comments: Right shoulder tenderness to palpation. Right arm in in sling.   No pain with thumbs up, a-ok sign, and fingers crossed.      Neurological: She is alert and oriented to person, place, and time. She has normal strength. No cranial nerve deficit or sensory deficit.   Sensation intact to axillary nerve region. Sensation intact to light touch of all extremities.    Skin: Skin is warm. Capillary refill takes less than 2 seconds. No rash noted.         ED Course   Procedural Sedation        Date/Time: 4/14/2025 1:02 PM    Performed by: Stefan Moss MD  Authorized by: Stefan Moss MD   "Consent Done: Yes  Consent: Verbal consent obtained. Written consent obtained  Risks and benefits: risks, benefits and alternatives were discussed  Consent given by: patient  Patient identity confirmed: , provided demographic data, MRN, verbally with patient and name  Time out: Immediately prior to procedure a "time out" was called to verify the correct patient, procedure, equipment, support staff and site/side marked as required.  ASA Class: Class 2 - Mild Illness without functional impairment.  Mallampati Score: Class 2 - Visualization of the soft palate, fauces, and uvula.     Equipment: on supplemental oxygen., on cardiac monitor., suction available., on CO2 monitor., airway equipment available. and on BP monitor.     Sedation type: moderate (conscious) sedation    Sedatives: propofol  Sedation start date/time: 2025 12:32 PM  Sedation end date/time: 2025 12:53 PM  Total Sedation Time (min): 21  Vitals: Vital signs were monitored during sedation.  Complications: No complications.   Comments: Medications administered by MD.  Patient/Family history of anesthesia or sedation complications: No    Orthopedic Injury    Date/Time: 2025 1:02 PM    Performed by: Stefan Moss MD  Authorized by: Stefan Moss MD    Location procedure was performed:  [facility] EMERGENCY DEPARTMENT  Pre-operative diagnosis:  Anterior shoulder dislocation  Post-operative diagnosis:  Anterior shoulder dislocation  Consent Done?:  Yes  Universal Protocol:     Verbal consent obtained?: Yes      Risks and benefits: Risks, benefits and alternatives were discussed      Consent given by:  Patient    Patient identity confirmed:  , name, verbally with patient, MRN and provided demographic data    Time Out: Immediately prior to the procedure a time out was called    Injury:     Injury location:  Shoulder    Location details:  Right shoulder    Injury type:  Dislocation    Dislocation type: anterior      Chronicity:  " New    Hill-Sachs deformity?: No        Pre-procedure assessment:     Neurovascular status: Neurovascularly intact      Distal perfusion: normal      Neurological function: normal      Range of motion: reduced        Selections made in this section will also lock the Injury type section above.:     Manipulation performed?: Yes      Reduction method:  External rotation and Milch technique    Reduction method:  External rotation and Milch technique    Reduction method:  External rotation and Milch technique    Reduction method:  External rotation and Milch technique    Reduction method:  External rotation and Milch technique    Reduction method:  External rotation and Milch technique    Reduction successful?: Yes      Confirmation: Reduction confirmed by x-ray      Immobilization:  Sling    Complications: No      Estimated blood loss (mL):  0    Specimens: No      Implants: No    Post-procedure assessment:     Neurovascular status: Neurovascularly intact      Distal perfusion: normal      Neurological function: normal      Range of motion: splinted      Patient tolerance:  Patient tolerated the procedure well with no immediate complications    Labs Reviewed   COMPREHENSIVE METABOLIC PANEL - Abnormal       Result Value    Sodium 137      Potassium 3.8      Chloride 108 (*)     CO2 17 (*)     Glucose 121 (*)     Blood Urea Nitrogen 28.9 (*)     Creatinine 0.92      Calcium 9.2      Protein Total 7.3      Albumin 4.0      Globulin 3.3      Albumin/Globulin Ratio 1.2      Bilirubin Total 1.5      ALP 66      ALT 17      AST 18      eGFR >60      Anion Gap 12.0      BUN/Creatinine Ratio 31     CBC WITH DIFFERENTIAL - Abnormal    WBC 11.01      RBC 4.56      Hgb 12.9      Hct 39.8      MCV 87.3      MCH 28.3      MCHC 32.4 (*)     RDW 13.8      Platelet 203      MPV 12.1 (*)     Neut % 76.2      Lymph % 18.6      Mono % 4.1      Eos % 0.4      Basophil % 0.3      Imm Grans % 0.4      Neut # 8.40      Lymph # 2.05      Mono  # 0.45      Eos # 0.04      Baso # 0.03      Imm Gran # 0.04      NRBC% 0.0     CBC W/ AUTO DIFFERENTIAL    Narrative:     The following orders were created for panel order CBC auto differential.  Procedure                               Abnormality         Status                     ---------                               -----------         ------                     CBC with Differential[[id number]]        Abnormal            Final result                 Please view results for these tests on the individual orders.          Imaging Results              X-Ray Shoulder Complete 2 View Right (Final result)  Result time 04/14/25 12:57:03      Final result by Destinee El MD (04/14/25 12:57:03)                   Impression:      Interval reduction of the right shoulder.      Electronically signed by: Destinee El  Date:    04/14/2025  Time:    12:57               Narrative:    EXAMINATION:  XR SHOULDER COMPLETE 2 OR MORE VIEWS RIGHT    CLINICAL HISTORY:  Pain in unspecified shoulder    COMPARISON:  X-rays dated 04/14/2025    FINDINGS:  There has been interval reduction of the right shoulder with improved alignment.  There is no definite displaced fracture identified.                                       X-Ray Chest AP Portable (Final result)  Result time 04/14/25 11:30:28      Final result by Scott Kauffman MD (04/14/25 11:30:28)                   Impression:      No acute cardiopulmonary findings.      Electronically signed by: Scott Kauffman  Date:    04/14/2025  Time:    11:30               Narrative:    EXAMINATION:  XR CHEST AP PORTABLE    CLINICAL HISTORY:  Pain in unspecified shoulder    COMPARISON:  24 November 2020    FINDINGS:  Frontal view of the chest was obtained. Heart is not significantly enlarged.  The lungs are grossly clear.  There is no pneumothorax. Right shoulder dislocation better evaluated on dedicated radiographs.                                       X-Ray Shoulder Complete 2 View  Right (Final result)  Result time 04/14/25 11:34:00      Final result by Destinee El MD (04/14/25 11:34:00)                   Impression:      Anterior shoulder dislocation.      Electronically signed by: Destinee El  Date:    04/14/2025  Time:    11:34               Narrative:    EXAMINATION:  XR SHOULDER COMPLETE 2 OR MORE VIEWS RIGHT    CLINICAL HISTORY:  Unspecified fall, initial encounter    COMPARISON:  None.    FINDINGS:  There is an anterior dislocation of the glenohumeral joint.  The soft tissues are unremarkable.                                       Medications   lactated ringers bolus 1,000 mL (0 mLs Intravenous Stopped 4/14/25 1145)   morphine injection 4 mg (4 mg Intravenous Given 4/14/25 1108)   ondansetron injection 4 mg (4 mg Intravenous Given 4/14/25 1108)   methocarbamoL injection 1,000 mg (1,000 mg Intravenous Given 4/14/25 1109)   propofol (DIPRIVAN) 10 mg/mL IVP (100 mg Intravenous Given 4/14/25 1238)     Medical Decision Making  Amount and/or Complexity of Data Reviewed  Labs: ordered.  Radiology: ordered. Decision-making details documented in ED Course.    Risk  Prescription drug management.    Differential diagnosis (includes but is not limited to):   Shoulder fracture, shoulder dislocation, neurovascular injury    MDM Narrative  54-year-old female reports right shoulder pain after a slip and fall while at work.  She denies any head injury or loss of consciousness.  Labs reviewed.  Pain medications reviewed.  X-rays reviewed, anterior shoulder dislocation identified.  Informed consent obtained for closed reduction.  Closed reduction performed.  Postreduction x-ray shows adequate realignment status post reduction.  Sling and swath provided.  Orthopedic surgery referral provided.  Prescriptions for symptom control provided.  Need for follow up with the PCP discussed and understood.  Strict return precautions discussed and understood.    Dispo: Discharge    My independent  radiology interpretation: as above  Point of care US (independently performed and interpreted):   Decision rules/clinical scoring:     Sepsis Perfusion Assessment:     Amount and/or Complexity of Data Reviewed  Independent historian: none   Summary of history:   External data reviewed: notes from previous ED visits and notes from clinic visits  Summary of data reviewed: Prior records reviewed  Risk and benefits of testing: discussed   Labs: ordered and reviewed  Radiology: ordered and independent interpretation performed (see above or ED course)  ECG/medicine tests: ordered and independent interpretation performed (see above or ED course)  Discussion of management or test interpretation with external provider(s): none   Summary of discussion:     Risk  OTC medications  Prescription drug management   Shared decision making     Critical Care  none    Data Reviewed/Counseling: I have personally reviewed the patient's vital signs, nursing notes, and other relevant tests, information, and imaging. I had a detailed discussion regarding the historical points, exam findings, and any diagnostic results supporting the discharge diagnosis. I personally performed the history, PE, MDM and procedures as documented above and agree with the scribe's documentation.    Portions of this note were dictated using voice recognition software. Although it was reviewed for accuracy, some inherent voice recognition errors may have occurred and may be present in this document.          Scribe Attestation:   Scribe #1: I performed the above scribed service and the documentation accurately describes the services I performed. I attest to the accuracy of the note.    Attending Attestation:           Physician Attestation for Scribe:  Physician Attestation Statement for Scribe #1: I, Stefan Moss MD, reviewed documentation, as scribed by Arnulfo Grace in my presence, and it is both accurate and complete.             ED Course as of 04/23/25  0923   Mon 2025   1137 X-Ray Shoulder Complete 2 View Right  Independently visualized/reviewed by me during the ED visit.  - Concern for anterior shoulder dislocation []   1137 X-Ray Chest AP Portable  Independently visualized/reviewed by me during the ED visit.  - No lobar consolidation or PTX [MC]   1300 X-Ray Shoulder Complete 2 View Right  Independently visualized/reviewed by me during the ED visit.  - Improved alignment of right shoulder [MC]   1356 I have reassessed the patient.  Patient is resting comfortably, no acute distress.  Vital signs stable.  Discussed all results including incidental findings.  Discussed need for follow up and discussed return precautions.  Answered all questions at this time.  Hemodynamically stable for continued outpatient management. Patient verbalized understanding and agreed to plan.    [MC]      ED Course User Index  [MC] Stefan Moss MD                           Clinical Impression:  Final diagnoses:  [M25.519] Shoulder pain  [W19.XXXA] Fall  [M25.519] Shoulder pain - POST-REDUCTION  [S43.004A] Dislocation of right shoulder joint, initial encounter (Primary)          ED Disposition Condition    Discharge Stable          ED Prescriptions       Medication Sig Dispense Start Date End Date Auth. Provider    ibuprofen (ADVIL,MOTRIN) 600 MG tablet Take 1 tablet (600 mg total) by mouth every 6 (six) hours as needed for Pain. 20 tablet 2025 -- Stefan Moss MD    ondansetron (ZOFRAN-ODT) 4 MG TbDL Take 1 tablet (4 mg total) by mouth every 6 (six) hours as needed (Nausea). 12 tablet 2025 -- Stefan Moss MD    methocarbamoL (ROBAXIN) 500 MG Tab () Take 2 tablets (1,000 mg total) by mouth 3 (three) times daily. for 5 days 30 tablet 2025 Stefan Moss MD          Follow-up Information       Follow up With Specialties Details Why Contact Info    Cristino Stauffer, DO Orthopedic Surgery Call   [address]  [facility] [address]  [phone]      Shayne Alfonso Jr., MD Family Medicine Schedule an appointment as soon as possible for a visit in 2 days  [address]  [facility] [address]  [phone]      [facility] - Emergency Dept Emergency Medicine  If symptoms worsen [address] [facility] [address] [phone]               [1]   Social History  Tobacco Use    Smoking status: Former    Smokeless tobacco: Never   Substance Use Topics    Alcohol use: Yes     Comment: beer occasionally    Drug use: Never        Stefan Moss MD  04/23/25 3466

## 2025-04-14 NOTE — Clinical Note
"Hortensia Elena (Cecile) was seen and treated in our emergency department on 4/14/2025.  She may return to work on 04/18/2025.       If you have any questions or concerns, please don't hesitate to call.      NAIDA BOO"

## 2025-04-20 ENCOUNTER — HOSPITAL ENCOUNTER (EMERGENCY)
Facility: HOSPITAL | Age: 55
Discharge: HOME | End: 2025-04-20
Attending: STUDENT IN AN ORGANIZED HEALTH CARE EDUCATION/TRAINING PROGRAM
Payer: OTHER MISCELLANEOUS

## 2025-04-20 VITALS
DIASTOLIC BLOOD PRESSURE: 59 MMHG | BODY MASS INDEX: 47.97 KG/M2 | HEIGHT: 64 IN | OXYGEN SATURATION: 98 % | RESPIRATION RATE: 20 BRPM | HEART RATE: 78 BPM | WEIGHT: 281 LBS | SYSTOLIC BLOOD PRESSURE: 121 MMHG | TEMPERATURE: 98 F

## 2025-04-20 DIAGNOSIS — M25.519 SHOULDER PAIN: ICD-10-CM

## 2025-04-20 DIAGNOSIS — S43.004A DISLOCATION OF RIGHT SHOULDER JOINT, INITIAL ENCOUNTER: ICD-10-CM

## 2025-04-20 DIAGNOSIS — M25.511 ACUTE PAIN OF RIGHT SHOULDER: Primary | ICD-10-CM
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
## 2025-04-20 PROCEDURE — 96374 THER/PROPH/DIAG INJ IV PUSH: CPT

## 2025-04-20 PROCEDURE — 99152 MOD SED SAME PHYS/QHP 5/>YRS: CPT

## 2025-04-20 PROCEDURE — 25000003 PHARM REV CODE 250: Performed by: STUDENT IN AN ORGANIZED HEALTH CARE EDUCATION/TRAINING PROGRAM

## 2025-04-20 PROCEDURE — 99285 EMERGENCY DEPT VISIT HI MDM: CPT | Mod: 25

## 2025-04-20 PROCEDURE — 63600175 PHARM REV CODE 636 W HCPCS: Performed by: STUDENT IN AN ORGANIZED HEALTH CARE EDUCATION/TRAINING PROGRAM

## 2025-04-20 PROCEDURE — 96361 HYDRATE IV INFUSION ADD-ON: CPT

## 2025-04-20 PROCEDURE — 23650 CLTX SHO DSLC W/MNPJ WO ANES: CPT | Mod: RT

## 2025-04-20 PROCEDURE — 96375 TX/PRO/DX INJ NEW DRUG ADDON: CPT

## 2025-04-20 RX ORDER — PROPOFOL 10 MG/ML
100 VIAL (ML) INTRAVENOUS ONCE
Status: DISCONTINUED | OUTPATIENT
Start: 2025-04-20 | End: 2025-04-20 | Stop reason: HOSPADM

## 2025-04-20 RX ORDER — KETAMINE HYDROCHLORIDE 50 MG/ML
INJECTION, SOLUTION INTRAMUSCULAR; INTRAVENOUS CODE/TRAUMA/SEDATION MEDICATION
Status: COMPLETED | OUTPATIENT
Start: 2025-04-20 | End: 2025-04-20

## 2025-04-20 RX ORDER — PROPOFOL 10 MG/ML
VIAL (ML) INTRAVENOUS CODE/TRAUMA/SEDATION MEDICATION
Status: COMPLETED | OUTPATIENT
Start: 2025-04-20 | End: 2025-04-20

## 2025-04-20 RX ORDER — KETAMINE HCL IN 0.9 % NACL 50 MG/5 ML
SYRINGE (ML) INTRAVENOUS
Status: COMPLETED
Start: 2025-04-20 | End: 2025-04-20

## 2025-04-20 RX ORDER — ONDANSETRON HYDROCHLORIDE 2 MG/ML
4 INJECTION, SOLUTION INTRAVENOUS
Status: COMPLETED | OUTPATIENT
Start: 2025-04-20 | End: 2025-04-20

## 2025-04-20 RX ORDER — MORPHINE SULFATE 4 MG/ML
4 INJECTION, SOLUTION INTRAMUSCULAR; INTRAVENOUS
Refills: 0 | Status: COMPLETED | OUTPATIENT
Start: 2025-04-20 | End: 2025-04-20

## 2025-04-20 RX ORDER — SODIUM CHLORIDE 9 MG/ML
INJECTION, SOLUTION INTRAVENOUS
Status: COMPLETED | OUTPATIENT
Start: 2025-04-20 | End: 2025-04-20

## 2025-04-20 RX ADMIN — SODIUM CHLORIDE 1000 ML: 9 INJECTION, SOLUTION INTRAVENOUS at 07:04

## 2025-04-20 RX ADMIN — PROPOFOL 50 MG: 10 INJECTION, EMULSION INTRAVENOUS at 07:04

## 2025-04-20 RX ADMIN — KETAMINE HYDROCHLORIDE 10 MG: 50 INJECTION INTRAMUSCULAR; INTRAVENOUS at 07:04

## 2025-04-20 RX ADMIN — PROPOFOL 30 MG: 10 INJECTION, EMULSION INTRAVENOUS at 07:04

## 2025-04-20 RX ADMIN — ONDANSETRON 4 MG: 2 INJECTION INTRAMUSCULAR; INTRAVENOUS at 06:04

## 2025-04-20 RX ADMIN — KETAMINE HYDROCHLORIDE 20 MG: 50 INJECTION INTRAMUSCULAR; INTRAVENOUS at 07:04

## 2025-04-20 RX ADMIN — MORPHINE SULFATE 4 MG: 4 INJECTION INTRAVENOUS at 06:04

## 2025-04-20 RX ADMIN — PROPOFOL 20 MG: 10 INJECTION, EMULSION INTRAVENOUS at 07:04

## 2025-04-20 NOTE — DISCHARGE INSTRUCTIONS
Follow-up with your primary care physician.      Follow-up with orthopedic surgery.  Keep existing appointment.      Keep arm in sling and swath.      Return to the emergency department if any new or worsening symptoms, worsening pain, nausea, vomiting, difficulty breathing, fever, headache, or any other concerns.

## 2025-04-20 NOTE — ED PROVIDER NOTES
Encounter Date: 4/20/2025    SCRIBE #1 NOTE: I, Arabella Wolf, am scribing for, and in the presence of,  Srinivasan Edouard MD. I have scribed the following portions of the note - Other sections scribed: HPI, ROS, PE.       History     Chief Complaint   Patient presents with    Shoulder Pain     Pt seen recently for fall at work and Dx w a shoulder dislocation. Pt c/o shoulder pain stating shoulder is out of place again     Annalisa is a 54-year-old female with a PMHx of HTN presents to the ED for right shoulder dislocation occurring ~1:00 this morning. Patient reports turning over in bed and her shoulder dislocated. She reports having an appointment with her orthopedic surgeon on 4/22.     reports patient fell on 4/14 and dislocated her right shoulder. He denies her having shoulder dislocations prior to falling.    The history is provided by the patient, medical records and the spouse. No  was used.     Review of patient's allergies indicates:  No Known Allergies  Past Medical History:   Diagnosis Date    COPD (chronic obstructive pulmonary disease)     Hypercholesteremia     Hypertension     Personal history of colonic polyps 12/19/2018    Rotator cuff tear     Sleep apnea     cpap     Past Surgical History:   Procedure Laterality Date    CHOLECYSTECTOMY      COLONOSCOPY  12/19/2018    Chris Petersen MD    COLONOSCOPY N/A 01/31/2023    Procedure: COLON;  Surgeon: Loi Meyer MD;  Location: [facility] ENDOSCOPY;  Service: Gastroenterology;  Laterality: N/A;    KNEE ARTHROSCOPY Right     POLYPECTOMY      TUBAL LIGATION       Family History   Problem Relation Name Age of Onset    Glaucoma Mother      Heart disease Mother      Hyperlipidemia Mother      Hypertension Mother      Stroke Father       Social History[1]  Review of Systems   Constitutional:  Negative for fever.   HENT:  Negative for sore throat.    Eyes:  Negative for visual disturbance.   Respiratory:  Negative for  shortness of breath.    Cardiovascular:  Negative for chest pain.   Gastrointestinal:  Negative for abdominal pain.   Genitourinary:  Negative for dysuria.   Musculoskeletal:  Negative for joint swelling.        Right shoulder dislocation.   Skin:  Negative for rash.   Neurological:  Negative for weakness.   Psychiatric/Behavioral:  Negative for confusion.        Physical Exam     Initial Vitals [04/20/25 0522]   BP Pulse Resp Temp SpO2   131/83 77 18 97.6 °F (36.4 °C) 98 %      MAP       --         Physical Exam    Nursing note and vitals reviewed.  Constitutional: She appears well-developed and well-nourished.   HENT:   Head: Normocephalic and atraumatic.   Eyes: EOM are normal. Pupils are equal, round, and reactive to light.   Neck:   Normal range of motion.  Cardiovascular:  Normal rate, regular rhythm, normal heart sounds and intact distal pulses.           No murmur heard.  Pulmonary/Chest: Breath sounds normal. No respiratory distress. She has no wheezes. She has no rales.   Abdominal: Abdomen is soft. She exhibits no distension. There is no abdominal tenderness. There is no rebound.   Musculoskeletal:      Cervical back: Normal range of motion.      Comments: Dislocated right shoulder.     Neurological: She is alert. She has normal strength. No cranial nerve deficit. GCS score is 15. GCS eye subscore is 4. GCS verbal subscore is 5. GCS motor subscore is 6.   Skin: Skin is warm and dry. Capillary refill takes less than 2 seconds. No rash noted. No erythema.   Psychiatric: She has a normal mood and affect.         ED Course   Procedural Sedation        Date/Time: 4/20/2025 7:12 AM    Performed by: Srinivasan Edouard MD  Authorized by: Srinivasan Edouard MD  Consent Done: Yes  Consent: Verbal consent obtained. Written consent obtained  Risks and benefits: risks, benefits and alternatives were discussed  Consent given by: patient  Patient understanding: patient states understanding of the procedure being  "performed  Patient consent: the patient's understanding of the procedure matches consent given  Procedure consent: procedure consent matches procedure scheduled  Relevant documents: relevant documents present and verified  Test results: test results available and properly labeled  Imaging studies: imaging studies available  Required items: required blood products, implants, devices, and special equipment available  Patient identity confirmed: provided demographic data, MRN and name  Time out: Immediately prior to procedure a "time out" was called to verify the correct patient, procedure, equipment, support staff and site/side marked as required.  ASA Class: Class 2 - Mild Illness without functional impairment.  Mallampati Score: Class 2 - Visualization of the soft palate, fauces, and uvula.   NPO STATUS:  Date/Time of last solid: 4/19/2025 11:00 PM  Date/Time of last fluid: 4/19/2025 11:00 PM    Equipment: on cardiac monitor., on BP monitor., on CO2 monitor., suction available., on supplemental oxygen., airway equipment available. and reversal drugs available.     Sedation type: moderate (conscious) sedation    Sedatives: propofol (110)  Analgesia: ketamine (30)  Sedation start date/time: 4/20/2025 7:12 AM  Sedation end date/time: 4/20/2025 7:51 AM  Total Sedation Time (min): 31  Vitals: Vital signs were monitored during sedation.  Complications: No complications.   Patient/Family history of anesthesia or sedation complications: No    Orthopedic Injury    Date/Time: 4/20/2025 7:17 AM    Performed by: Srinivasan Edouard MD  Authorized by: Srinivasan Edouard MD    Location procedure was performed:  [facility] EMERGENCY DEPARTMENT  Consent Done?:  Yes  Universal Protocol:     Verbal consent obtained?: Yes      Written consent obtained?: Yes      Risks and benefits: Risks, benefits and alternatives were discussed      Consent given by:  Patient    Patient states understanding of procedure being performed: Yes      Patient's " understanding of procedure matches consent: Yes      Procedure consent matches procedure scheduled: Yes      Test results available and properly labeled: Yes      Imaging studies available: Yes      Required items: Required blood products, implants, devices and special equipment avialable      Patient identity confirmed:  [place], provided demographic data and name  Injury:     Injury location:  Shoulder    Location details:  Right shoulder    Injury type:  Dislocation    Dislocation type: anterior      Chronicity:  Recurrent    Hill-Sachs deformity?: No        Pre-procedure assessment:     Neurovascular status: Neurovascularly intact      Distal perfusion: normal      Neurological function: normal      Range of motion: reduced      Local anesthesia used?: No      Patient sedated?: Yes      ASA Class:  Class 2 - Mild Illness without functional impairment.    Mallampati Score:  Class 2 - Visualization of the soft palate, fauces, and uvula.  Date/Time of last solid:  4/20/2025 11:00 PM    Date/Time of last fluid:  4/20/2025 11:00 PM    Sedation:  Propofol    Analgesia:  Ketamine    Sedation start:  4/20/2025 7:12 AM    Sedation end:  4/20/2025 7:51 AM    Vital signs: Vital signs monitored during sedation        Selections made in this section will also lock the Injury type section above.:     Manipulation performed?: Yes      Reduction method:  External rotation and traction and counter traction    Reduction method:  External rotation and traction and counter traction    Reduction method:  External rotation and traction and counter traction    Reduction method:  External rotation and traction and counter traction    Reduction method:  External rotation and traction and counter traction    Reduction method:  External rotation and traction and counter traction    Reduction successful?: Yes      Confirmation: Reduction confirmed by x-ray      Immobilization:  Sling    Splint type: Sling and swathe.    Complications: No       Specimens: No      Implants: No    Post-procedure assessment:     Neurovascular status: Neurovascularly intact      Distal perfusion: normal      Neurological function: normal      Range of motion: splinted      Patient tolerance:  Patient tolerated the procedure well with no immediate complications  Splint Application    Date/Time: 4/20/2025 8:00 AM    Performed by: Srinivasan Edouard MD  Authorized by: Srinivasan Edouard MD  Consent Done: Not Needed  Location: L shoulder.  Splint type: Sling swathe.  Post-procedure: The splinted body part was neurovascularly unchanged following the procedure.  Patient tolerance: Patient tolerated the procedure well with no immediate complications        Labs Reviewed - No data to display       Imaging Results              X-Ray Shoulder 1 View Right (Final result)  Result time 04/20/25 08:29:07   Procedure changed from X-Ray Shoulder Complete 2 View Right     Final result by Scott Kauffman MD (04/20/25 08:29:07)                   Impression:      Single frontal image of the right shoulder. Interval successful reduction of the glenohumeral dislocation.      Electronically signed by: Scott Kauffman  Date:    04/20/2025  Time:    08:29               Narrative:    EXAMINATION:  XR SHOULDER 1 VIEW RIGHT    CLINICAL HISTORY:  post reduction;  Unspecified fall, initial encounter    COMPARISON:  Earlier today                        Wet Read by Srinivasan Edouard MD (04/20/25 08:22:42, [facility] - Emergency Dept, Emergency Medicine)    reduced                                     X-Ray Shoulder Complete 2 View Right (Final result)  Result time 04/20/25 08:28:24      Final result by Scott Kauffman MD (04/20/25 08:28:24)                   Impression:      Anterior shoulder dislocation.      Electronically signed by: Scott Kauffman  Date:    04/20/2025  Time:    08:28               Narrative:    EXAMINATION:  XR SHOULDER COMPLETE 2 OR MORE VIEWS RIGHT    CLINICAL HISTORY:  Pain in  unspecified shoulder.  Dislocation.    COMPARISON:  14 April 2025    FINDINGS:  Three views of the right shoulder.  There is anterior glenohumeral dislocation.  No convincing fracture.                        Wet Read by Srinivasan Edouard MD (04/20/25 06:01:27, [facility])    Anterior dislocation                                     Medications   morphine injection 4 mg (4 mg Intravenous Given 4/20/25 0625)   ondansetron injection 4 mg (4 mg Intravenous Given 4/20/25 0625)   propofol (DIPRIVAN) 10 mg/mL IVP (30 mg Intravenous Given 4/20/25 0721)   0.9% NaCl infusion (0 mL/hr Intravenous Stopped 4/20/25 0840)   ketamine injection (10 mg Intravenous Given 4/20/25 0721)   ketamine in 0.9 % sod chloride 50 mg/5 mL (10 mg/mL) injection (  Override Pull 4/20/25 0717)     Medical Decision Making  Judging by the patient's chief complaint and pertinent history, the patient has the following possible differential diagnoses, including but not limited to the following.  Some of these are deemed to be lower likelihood and some more likely based on my physical exam and history combined with possible lab work and/or imaging studies.   Please see the pertinent studies, and refer to the HPI.  Some of these diagnoses will take further evaluation to fully rule out, perhaps as an outpatient and the patient was encouraged to follow up when discharged for more comprehensive evaluation.      Fracture, contusion, dislocation     Patient is a 54-year-old female presents to emergency department for dislocation of the right shoulder.  See HPI.  See physical exam.  Sedated and reduced.  Placed in sling and swath.  Discussed all results.  Discussed need for follow-up with orthopedic surgery.  Discussed return precautions.  Answered all questions.  Hemodynamically stable for continued outpatient management with strict return precautions.    Problems Addressed:  Acute pain of right shoulder: acute  illness or injury that poses a threat to life or bodily functions  Dislocation of right shoulder joint, initial encounter: acute illness or injury that poses a threat to life or bodily functions  Fall: acute illness or injury that poses a threat to life or bodily functions  Shoulder pain: acute illness or injury that poses a threat to life or bodily functions    Amount and/or Complexity of Data Reviewed  Radiology: ordered and independent interpretation performed.    Risk  Prescription drug management.  Parenteral controlled substances.  Decision regarding hospitalization.            Scribe Attestation:   Scribe #1: I performed the above scribed service and the documentation accurately describes the services I performed. I attest to the accuracy of the note.    Attending Attestation:           Physician Attestation for Scribe:  Physician Attestation Statement for Scribe #1: I, Srinivasan Edouard MD, reviewed documentation, as scribed by Arabella Wolf in my presence, and it is both accurate and complete.                                    Clinical Impression:  Final diagnoses:  [M25.519] Shoulder pain  [W19.XXXA] Fall  [W19.XXXA] Fall - post reduction  [M25.511] Acute pain of right shoulder (Primary)  [S43.004A] Dislocation of right shoulder joint, initial encounter          ED Disposition Condition    Discharge Stable          ED Prescriptions    None       Follow-up Information       Follow up With Specialties Details Why Contact Info    Shayne Alfonso Jr., MD Family Medicine   [address] [place] [id number]  [phone]      Primary Care  Call in 1 day  Please call [phone] for a primary care provider.                 [1]   Social History  Tobacco Use    Smoking status: Former    Smokeless tobacco: Never   Substance Use Topics    Alcohol use: Yes     Comment: beer occasionally    Drug use: Never        Srinivasan Edouard MD  05/17/25 [id number]

## 2025-04-22 ENCOUNTER — OFFICE VISIT (OUTPATIENT)
Dept: ORTHOPEDICS | Facility: CLINIC | Age: 55
End: 2025-04-22
Payer: OTHER MISCELLANEOUS

## 2025-04-22 ENCOUNTER — HOSPITAL ENCOUNTER (OUTPATIENT)
Dept: RADIOLOGY | Facility: CLINIC | Age: 55
Discharge: HOME | End: 2025-04-22
Attending: ORTHOPAEDIC SURGERY
Payer: OTHER MISCELLANEOUS

## 2025-04-22 VITALS
DIASTOLIC BLOOD PRESSURE: 66 MMHG | HEIGHT: 64 IN | SYSTOLIC BLOOD PRESSURE: 112 MMHG | WEIGHT: 284 LBS | BODY MASS INDEX: 48.49 KG/M2 | HEART RATE: 72 BPM

## 2025-04-22 DIAGNOSIS — S43.004A DISLOCATION OF RIGHT SHOULDER JOINT, INITIAL ENCOUNTER: ICD-10-CM

## 2025-04-22 DIAGNOSIS — S42.91XD TRAUMATIC CLOSED DISPLACED FRACTURE OF RIGHT SHOULDER WITH ANTERIOR DISLOCATION WITH ROUTINE HEALING, SUBSEQUENT ENCOUNTER: Primary | ICD-10-CM

## 2025-04-22 DIAGNOSIS — S42.91XD TRAUMATIC CLOSED DISPLACED FRACTURE OF RIGHT SHOULDER WITH ANTERIOR DISLOCATION WITH ROUTINE HEALING, SUBSEQUENT ENCOUNTER: ICD-10-CM

## 2025-04-22 DIAGNOSIS — M75.101 TEAR OF RIGHT ROTATOR CUFF, UNSPECIFIED TEAR EXTENT, UNSPECIFIED WHETHER TRAUMATIC: ICD-10-CM

## 2025-04-22 PROCEDURE — 99204 OFFICE O/P NEW MOD 45 MIN: CPT | Mod: ,,, | Performed by: ORTHOPAEDIC SURGERY

## 2025-04-22 NOTE — LETTER
[facility] FORM 1010 - REQUEST OF AUTHORIZATION/CARRIER OR SELF INSURED EMPLOYER RESPONSE  PLEASE PRINT OR TYPE  SECTION 1. IDENTIFYING INFORMATION - To Be Filled Out By Health Care Provider    P  A Last Name:   Kassy First:   Hortensia Middle:   F Street Address, [facility], Zip:   [address], [facility] [address]   T  I Last 4 Digits of Social Security Number:   [id number] YOB: 1970 Phone Number:    There are no phone numbers on file. Date of Injury:   04/14/2025   E  N  T Employers Name:    [employer]   Street Address, [facility], Zip:   [address], [place], [place], [address] Phone Number:   [phone]     C  A  R  R Name:   LOCA  :   ELIF VITAL  Claim Number (if known):   [id number]     I  E  R [address] Address, [facility], Zip:   [address] [place] [address]  Email Address:    Phone Number:   [phone]  Fax Number:   [phone]    SECTION 2. REQUEST FOR AUTHORIZATION - To Be Filled Out By Health Care Provider      P Requesting Health Care Provider:   Cristino Stauffer DO Phone Number:   [phone] Fax Number:   [phone]   R  O Street Address, [facility], Zip:   [address] [address] Email:    [email or web address]   V  I Diagnosis:   CHRONIC SHOULDER DISLOCATIONS RIGHT  CPT/DRG Code:    ICD/DSM Code:   M24.411    D  E Requested Treatment or Testing (Attach Supplement If Needed): MRI RIGHT SHOULDER AT [facility].    R Reason for Treatment or Testing (Attach Supplement If Needed):    INFORMATION REQUIRED BY RULE TO BE INCLUDED WITH REQUEST FOR AUTHORIZATION - To Be Filled Out By Health Care Provider  (Following is the required minimum information for Request of Authorization (LAC 40:2715 (C))                    [x]  History provided to the level of condition and as provided by Medical Treatment Schedule   P                  []  Physical Findings/Clinical Tests   R                 []  Documented  functional improvements from prior treatment   O                 []  Test/imaging results   V                 []  Treatment Plan including services being requested along with the frequency and duration   I  D  E                                                                                                                                            [x]     Faxed          to the [employer]/Self Insured Employer on this the      I hereby certify that this completed form and above required information was                                                           22     day of   04,               2025                                                                                                                             []      Emailed                  (day)                 (month)         (year)   R Signature of Health Care Provider:    Cristino Jin attached records Printed Name:    Cristino mosher attached records   SECTION 3. RESPONSE OF CARRIER/SELF INSURED EMPLOYER FOR AUTHORIZATION  (Check appropriate box below and return to requesting Health Care Provider, Claimant and Claimant  as provided by rule)       []  The requested Treatment or Testing is approved       []  The requested Treatment or Testing is approved with modifications (Attach summary of reasons and explanation of any modifications)       []  The requested Treatment or Testing is denied because                                       []          Not in accordance with Medical Treatment Schedule or R.S.23:1203.1(D) (Attach summary of reasons)                                       []          The request, or a portion thereof, is not related to the on-the-job injury                                       []          The claim is being denied as non-compensable                                       []          Other (Attach brief explanation)   C  A  R  R  I                                                                                                                                             []     Faxed          to the Health Care Provider (and to the  of                                                                                                                                                                             Claimant if one exists, if denied or approved with   I hereby certify that this response of  [employer]/Self Insured Employer for Authorization was                                                 modification) on this the                                                                                                                                                                                     ______  day of   _______ ,   _______                                                                                                                             []      Emailed                  (day)                 (month)         (year)   E  R Signature of Carrier/Self Insured Employer or Utilization Review Company: Printed Name:           []   The prior denied or approved with modification request is now  approved                                                                                                                                               []     Faxed          to the Health Care Provider and  of Claimant                                                                                                                                                                                                    if one exists on this the   I hereby certify that this response of  [employer]/Self Insured Employer for Authorization was                                      ______  day of   _______ ,   _______                                                                                                                                             []      Emailed                      (day)                 (month)          (year)    Signature of Carrier/Self Insured Employer or Utilization Review Company:  Printed Name:       SECTION 4. FIRST REQUEST   (Form 1010A is required to be filled out by Car[employer]/Self Insured Employer and Health Care Provider)   C           []  The requested Treatment or Testing is delayed because minimum information required by rule was not provided   A  R  R  I                                                                                                                                            []     Faxed                 to the Health Care Provider on this the      I hereby certify that this First Request and accompanying Form 1010A was                                                       ______  day of   _______ ,   _______                                                                                                                             []      Emailed                  (day)                 (month)         (year)   E  R Signature of Carrier/Self Insured Employer or Utilization Review Company:     P  R  O  V  I  D                                                                                                                                            []     Faxed          to the Carrier/Self Insured Employer on this the                                I hereby certify that a response to the First Request and                                               accompanying Form 1010A was                                                                                 ______  day of   _______ ,   _______                                                                                                                             []      Emailed                  (day)                 (month)         (year)   E  R Signature of Health Care Provider: Printed Name:   SECTION 5. SUSPENSION OF PRIOR AUTHORIZATION DUE TO LACK OF INFORMATION       C   Suspension of Prior Authorization Process due to Lack of  Information     A  R           []  The requested Treatment or Testing is delayed due to a Suspension of Prior Authorization Due to Lack of Information   R  I  E                                                                                                                           []     Faxed                 to the Health Care Provider on this the      I hereby certify that this Suspension of Prior Authorization was                                                       ______  day of   _______ ,   _______                                                                                                                            []      Emailed                  (day)                 (month)         (year)   R Signature of Carrier/Self Insured Employer or Utilization Review Company:   Printed Name:       P    Appeal of Suspension to Medical Services Section by Health Care Provider     R  O  V  I hereby certify that this form and all information previously submitted to [employer]/Self Insured Employer   was faxed to [employer]  (Fax Number: [phone] this ______  day of   _______ ,   _______   I  D  E                                                                                                                           []     Faxed       to the Carrier/Self Insured Employer on this the      I hereby certify that this Appeal of Suspension of Prior Authorization was                                        ______  day of   _______ ,   _______                                                                                                                            []      Emailed                  (day)                 (month)         (year)   R Signature of Health Care Provider:   Printed Name:     SECTION 6. DETERMINATION OF MEDICAL SERVICES SECTION              []  The required information of LAC40:2715(C) was not provided              []  The required information of LAC40:2715(C) was provided   O  ALLY DOMÍNGUEZ                                                                                                                            []     Faxed           to the Health Care Provider  & [employer]/Self                                                                                                                                                                       Insured Employer on this the                      I hereby certify that a written determination was                                                                ______  day of   _______ ,   _______                                                                                                                            []      Emailed                  (day)                 (month)         (year)    Signature: Printed Name:     SECTION 7. HEALTH CARE PROVIDER RESPONSE TO MEDICAL SERVICES DETERMINATION   P  R  O  V  I  D                                                                                                                             []     Faxed       to the Carrier/Self Insured Employer on this the   I hereby certify that additional information, pursuant to the determination of                                       Medical Services Section, was                                    []      Emailed              ______  day of   _______ ,   _______                                                                                                                                                                     (day)                 (month)         (year)   E  R Signature of Health Care Provider: Printed Name:

## 2025-04-22 NOTE — PROGRESS NOTES
"Subjective:       Patient ID: Hortensia Elena is a 54 y.o. female.  Chief Complaint   Patient presents with    Shoulder Injury     WC-- DOI: 4/14/25 Right Shoulder Dislocation injury patient states she was moving a table and she tripped over the two tables that was being moved. She went to [facility] on 4/14/25 and she went again on 4/20/25 for it coming out of place. She does have arm sling on today. She was adjusted.         HPI:  History of Present Illness    HPI:  Ms. Elena presents with a work-related shoulder injury. She dislocated her shoulder twice in the past week, which is her first time experiencing a shoulder dislocation. This is noted as rare for her age group. She works as a lunch lady.    She has significant limitation in range of motion, unable to lift her arm straight up. She avoids moving the affected arm due to discomfort but can move her elbow.    She has COPD but quit smoking in 2015. She is currently on medication for COPD, which she reports is working well for her.    She denies having diabetes.    MEDICATIONS:  Ms. Elena is on Ventolin (albuterol) for her COPD.    SOCIAL HISTORY:  She quit smoking in 2015. Ms. Elena has COPD.    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 She is currently unable to lift anything with her right arm due to a shoulder injury. No light duty is available at her workplace.      ROS:  Musculoskeletal: +pain with movement, +limited movement          ROS:  Constitutional: Denies fever chills  Eyes: No change in vision  ENT: No ringing or current infections  CV: No chest pain  Resp: No labored breathing  MSK: Pain evident at site of injury located in HPI,   Integ: No signs of abrasions or lacerations  Neuro: No numbness or tingling  Lymphatic: No swelling outside the area of injury     Medications Ordered Prior to Encounter[1]       Objective:      /66 (BP Location: Left arm, Patient Position: Sitting)   Pulse 72   Ht 5' 4" (1.626 m)  "  Wt 128.8 kg (284 lb)   BMI 48.75 kg/m²   General the patient is alert and oriented x3 no acute distress nontoxic-appearing appropriate affect.    Constitutional: Vital signs are examined and stable.  Resp: No signs of labored breathing                RUE: -Skin:  Severe morbid obesity No signs of new abrasions or lacerations, no scars           -MSK: STR 5/5 AIN/PIN/Median/Radial/Ulnar motor, positive drop-arm with a right shoulder rotator cuff tear           -Neuro:  Sensation intact to light touch C5-T1 dermatomes           -Lymphatic: No signs of  lymphadenopathy,            -CV:  Capillary refill is less than 2 seconds. Radial and ulnar pulses 2/4. Compartments soft and compressible     Body mass index is 48.75 kg/m².  Ideal body weight: 54.7 kg (120 lb 9.5 oz)  Adjusted ideal body weight: 84.3 kg (185 lb 15.3 oz)  Hemoglobin A1c   Date Value Ref Range Status   09/09/2020 6.5 (H) 4.5 - 6.2 % Final   08/31/2019 6.4 (H) 4.5 - 6.2 % Final     Hgb   Date Value Ref Range Status   04/14/2025 12.9 12.0 - 16.0 g/dL Final   11/24/2020 11.7 (L) 12.0 - 16.0 gm/dL Final     Vitamin D   Date Value Ref Range Status   09/09/2020 49.1 6.6 - 49.9 ng/mL Final   08/31/2019 84.83 (H) 30.00 - 80.00 ng/mL Final     WBC   Date Value Ref Range Status   04/14/2025 11.01 4.50 - 11.50 x10(3)/mcL Final   11/24/2020 7.2 4.5 - 11.5 x10(3)/mcL Final       Radiology:  Two views right shoulder skeletally mature individual no sign of fracture glenohumeral joint appears to be reduced        Assessment:         1. Traumatic closed displaced fracture of right shoulder with anterior dislocation with routine healing, subsequent encounter  X-ray Shoulder 2 or More Views Right      2. Dislocation of right shoulder joint, initial encounter  Ambulatory referral/consult to Orthopedics      3. Tear of right rotator cuff, unspecified tear extent, unspecified whether traumatic                Plan:         No follow-ups on file.    Hortensia was seen today  for shoulder injury.    Diagnoses and all orders for this visit:    Traumatic closed displaced fracture of right shoulder with anterior dislocation with routine healing, subsequent encounter  -     X-ray Shoulder 2 or More Views Right; Future    Dislocation of right shoulder joint, initial encounter  -     Ambulatory referral/consult to Orthopedics    Tear of right rotator cuff, unspecified tear extent, unspecified whether traumatic      Assessment & Plan    PLAN SUMMARY:  - MRI of Right Shoulder ordered  - Referral to sports physicians for potential soft tissue reconstruction  - Work note provided  - No lifting with right upper extremity  - Referral to PT for shoulder strengthening  - Remain non-weight bearing on right upper extremity  - Follow up in 2 weeks to review MRI results    FOLLOW UP:  - Follow up in 2 weeks with MRI results.  - Work note provided.    REFERRALS:  - Referred to sports physicians for potential soft tissue reconstruction.  - Referred to PT to strengthen the shoulder and prevent dislocations.    IMAGING:  - Ordered MRI Right Shoulder.    PROCEDURES:  - # Procedures    PATIENT INSTRUCTIONS:  - Remain non-weight bearing on right upper extremity.  - Do not lift anything with right upper extremity.          Times patient is here for right proximal humerus glenohumeral dislocation.  She denies previous dislocation.  She is here with a workman's comp injury.  She has dislocated twice over the last week.  No neurovascular injury.  Has a drop arm positive rotator cuff tear likely. We will MRI the shoulder now. SIMONA OGDEN and will hold PT until MRI is complete.       This note/OR report was created with the assistance of  voice recognition software or phone  dictation.  There may be transcription errors as a result of using this technology however minimal. Effort has been made to assure accuracy of transcription but any obvious errors or omissions should be clarified with the author of the document.      This note was generated with the assistance of ambient listening technology. Verbal consent was obtained by the patient and accompanying visitor(s) for the recording of patient appointment to facilitate this note. I attest to having reviewed and edited the generated note for accuracy, though some syntax or spelling errors may persist. Please contact the author of this note for any clarification.       Cristino Stauffer DO  Orthopedic Trauma Surgery  04/22/2025      Future Appointments   Date Time Provider Department Center   4/22/2025  8:45 AM [facility] [place]   5/15/2025  2:30 PM Abdiaziz Patel, AGACNP-BC [facility]                   [1]   Current Outpatient Medications on File Prior to Visit   Medication Sig Dispense Refill    cetirizine (ZYRTEC) 10 MG tablet Take 10 mg by mouth once daily.      ergocalciferol (ERGOCALCIFEROL) 50,000 unit Cap Take 50,000 Units by mouth every 7 days.      hydroCHLOROthiazide (HYDRODIURIL) 25 MG tablet Take 25 mg by mouth.      ibuprofen (ADVIL,MOTRIN) 600 MG tablet Take 1 tablet (600 mg total) by mouth every 6 (six) hours as needed for Pain. 20 tablet 0    lisinopriL (PRINIVIL,ZESTRIL) 40 MG tablet Take 50 mg by mouth once daily.      losartan (COZAAR) 100 MG tablet Take 100 mg by mouth once daily.      meloxicam (MOBIC) 15 MG tablet Take 15 mg by mouth once daily at 6am.      MOUNJARO 15 mg/0.5 mL PnIj       ondansetron (ZOFRAN-ODT) 4 MG TbDL Take 1 tablet (4 mg total) by mouth every 6 (six) hours as needed (Nausea). 12 tablet 0    rosuvastatin (CRESTOR) 40 MG Tab Take 40 mg by mouth every evening.      tirzepatide (MOUNJARO) 10 mg/0.5 mL PnIj Inject 10 mg into the skin every 7 days.       No current facility-administered medications on file prior to visit.

## 2025-05-01 ENCOUNTER — PATIENT MESSAGE (OUTPATIENT)
Dept: NEUROLOGY | Facility: CLINIC | Age: 55
End: 2025-05-01
Payer: OTHER MISCELLANEOUS

## 2025-05-08 ENCOUNTER — OFFICE VISIT (OUTPATIENT)
Dept: ORTHOPEDICS | Facility: CLINIC | Age: 55
End: 2025-05-08
Payer: OTHER MISCELLANEOUS

## 2025-05-08 VITALS
DIASTOLIC BLOOD PRESSURE: 81 MMHG | HEIGHT: 64 IN | BODY MASS INDEX: 48.47 KG/M2 | HEART RATE: 78 BPM | SYSTOLIC BLOOD PRESSURE: 138 MMHG | WEIGHT: 283.94 LBS

## 2025-05-08 DIAGNOSIS — S43.004A DISLOCATION OF RIGHT SHOULDER JOINT, INITIAL ENCOUNTER: Primary | ICD-10-CM

## 2025-05-08 PROCEDURE — 99214 OFFICE O/P EST MOD 30 MIN: CPT | Mod: ,,, | Performed by: ORTHOPAEDIC SURGERY

## 2025-05-08 NOTE — LETTER
[facility] FORM 1010 - REQUEST OF AUTHORIZATION/CARRIER OR SELF INSURED EMPLOYER RESPONSE  PLEASE PRINT OR TYPE  SECTION 1. IDENTIFYING INFORMATION - To Be Filled Out By Health Care Provider    P  A Last Name:   Kassy First:   Hortensia Middle:   F Street Address, [facility], Zip:   [address], [facility] [address]   T  I Last 4 Digits of Social Security Number:   [id number] YOB: 1970 Phone Number:    There are no phone numbers on file. Date of Injury:   04/14/2025   E  N  T Employers Name:    [employer] [facility] [employer] [facility]   Street Address, [facility], Zip:   [address], [place], [place], [address] Phone Number:   [phone]     C  A  R  R Name:   LOCA  :   ELIF VITAL  Claim Number (if known):   [id number]     I  [address] Address, [facility], Zip:   [address] [place] [address]  Email Address:    Phone Number:   [phone]  Fax Number:   [phone]    SECTION 2. REQUEST FOR AUTHORIZATION - To Be Filled Out By Health Care Provider      P Requesting Health Care Provider:   Cristino Stauffer DO Phone Number:   [phone] Fax Number:   [phone]   R  O Street Address, [facility], Zip:   [address] [address] Email:    [email or web address]   V  I Diagnosis:   RIGHT SHOULDER INFERIOR LABRAL TEAR  CPT/DRG Code:    ICD/DSM Code:   S43.432A   D  E Requested Treatment or Testing (Attach Supplement If Needed): REFERRAL TO DR. ORQUIDEA NASSAR FOR EVAL AND TREAT INFERIOR LABRAL TEAR RIGHT SHOULDER   R Reason for Treatment or Testing (Attach Supplement If Needed):    INFORMATION REQUIRED BY RULE TO BE INCLUDED WITH REQUEST FOR AUTHORIZATION - To Be Filled Out By Health Care Provider  (Following is the required minimum information for Request of Authorization (LAC 40:2715 (C))                    [x]  History provided to the level of condition and as provided by Medical Treatment Schedule   P                  []  Physical Findings/Clinical Tests   R                  []  Documented functional improvements from prior treatment   O                 []  Test/imaging results   V                 []  Treatment Plan including services being requested along with the frequency and duration   I  D  E                                                                                                                                            [x]     Faxed          to the [employer]rier/Self Insured Employer on this the      I hereby certify that this completed form and above required information was                                                           05     day of   08,               [id number]                                                                                                                             []      Emailed                  (day)                 (month)         (year)   R Signature of Health Care Provider:    Cristino Jin attached records Printed Name:    Cristino mosher attached records   SECTION 3. RESPONSE OF CARRIER/SELF INSURED EMPLOYER FOR AUTHORIZATION  (Check appropriate box below and return to requesting Health Care Provider, Claimant and Claimant  as provided by rule)       []  The requested Treatment or Testing is approved       []  The requested Treatment or Testing is approved with modifications (Attach summary of reasons and explanation of any modifications)       []  The requested Treatment or Testing is denied because                                       []          Not in accordance with Medical Treatment Schedule or R.S.23:1203.1(D) (Attach summary of reasons)                                       []          The request, or a portion thereof, is not related to the on-the-job injury                                       []          The claim is being denied as non-compensable                                       []          Other (Attach brief explanation)   C  A  R  R  I                                                                                                                                             []     Faxed          to the Health Care Provider (and to the  of                                                                                                                                                                             Claimant if one exists, if denied or approved with   I hereby certify that this response of  Car[employer]/Self Insured Employer for Authorization was                                                 modification) on this the                                                                                                                                                                                     ______  day of   _______ ,   _______                                                                                                                             []      Emailed                  (day)                 (month)         (year)   E  R Signature of Carrier/Self Insured Employer or Utilization Review Company: Printed Name:           []   The prior denied or approved with modification request is now  approved                                                                                                                                               []     Faxed          to the Health Care Provider and  of Claimant                                                                                                                                                                                                    if one exists on this the   I hereby certify that this response of  Carrier/Self Insured Employer for Authorization was                                      ______  day of   _______ ,   _______                                                                                                                                             []      Emailed                      (day)                  (month)         (year)    Signature of Carrier/Self Insured Employer or Utilization Review Company:  Printed Name:       SECTION 4. FIRST REQUEST   (Form 1010A is required to be filled out by Carrier/Self Insured Employer and Health Care Provider)   C           []  The requested Treatment or Testing is delayed because minimum information required by rule was not provided   A  R  R  I                                                                                                                                            []     Faxed                 to the Health Care Provider on this the      I hereby certify that this First Request and accompanying Form 1010A was                                                       ______  day of   _______ ,   _______                                                                                                                             []      Emailed                  (day)                 (month)         (year)   E  R Signature of Carrier/Self Insured Employer or Utilization Review Company:     P  R  O  V  I  D                                                                                                                                            []     Faxed          to the Carrier/Self Insured Employer on this the                                I hereby certify that a response to the First Request and                                               accompanying Form 1010A was                                                                                 ______  day of   _______ ,   _______                                                                                                                             []      Emailed                  (day)                 (month)         (year)   E  R Signature of Health Care Provider: Printed Name:   SECTION 5. SUSPENSION OF PRIOR AUTHORIZATION DUE TO LACK OF INFORMATION       C   Suspension of Prior Authorization  Process due to Lack of Information     A  R           []  The requested Treatment or Testing is delayed due to a Suspension of Prior Authorization Due to Lack of Information   R  I  E                                                                                                                           []     Faxed                 to the Health Care Provider on this the      I hereby certify that this Suspension of Prior Authorization was                                                       ______  day of   _______ ,   _______                                                                                                                            []      Emailed                  (day)                 (month)         (year)   R Signature of Carrier/Self Insured Employer or Utilization Review Company:   Printed Name:       P    Appeal of Suspension to Medical Services Section by Health Care Provider     R  O  V  I hereby certify that this form and all information previously submitted to Car[employer]/Self Insured Employer   was faxed to [employer]  (Fax Number: [phone] this ______  day of   _______ ,   _______   I  D  E                                                                                                                           []     Faxed       to the Carrier/Self Insured Employer on this the      I hereby certify that this Appeal of Suspension of Prior Authorization was                                        ______  day of   _______ ,   _______                                                                                                                            []      Emailed                  (day)                 (month)         (year)   R Signature of Health Care Provider:   Printed Name:     SECTION 6. DETERMINATION OF MEDICAL SERVICES SECTION              []  The required information of LAC40:2715(C) was not provided              []  The required information of LAC40:2715(C) was  provided   O  W  C  A                                                                                                                           []     Faxed           to the Health Care Provider  & Carrier/Self                                                                                                                                                                       Insured Employer on this the                      I hereby certify that a written determination was                                                                ______  day of   _______ ,   _______                                                                                                                            []      Emailed                  (day)                 (month)         (year)    Signature: Printed Name:     SECTION 7. HEALTH CARE PROVIDER RESPONSE TO MEDICAL SERVICES DETERMINATION   P  R  O  V  I  D                                                                                                                             []     Faxed       to the Carrier/Self Insured Employer on this the   I hereby certify that additional information, pursuant to the determination of                                       Medical Services Section, was                                    []      Emailed              ______  day of   _______ ,   _______                                                                                                                                                                     (day)                 (month)         (year)   E  R Signature of Health Care Provider: Printed Name:

## 2025-05-08 NOTE — LETTER
[facility]  [address] [place] [address]. [address], [address]  Phone: [phone]  Fax: [phone]    Name:Hortensia Elena  :1970   Date:2025     PATIENT IS UNABLE TO WORK AS OF: 2025  [_] Pending treatment.  [XX] For approximately [_] Days [6] Weeks [_] Months  [_] Pending diagnostic testing.  [_] Pending surgical treatment.  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  Although a sedentary job is defined as one which involved sitting, a certain amount of walking and standing are required only occasionally and other sedentary criteria are met.    [_] LIGHT WORK: Lifting 20 pounds with frequent lifting and/or carrying objects weighing up to 10 pounds.  Even though the weight lifted may be only a negotiable amount, a job is in the category when it involves sitting most of the time with a degree of pushing/pulling of arm and/or leg controls.    [_] MEDIUM WORK: Lifting of 50 pounds maximum with frequent lifting and/or carrying of objects up to 25 pounds.    [_] HEAVY WORK: Lifting of 100 pounds maximum with frequent lifting and/or carrying objects up to 50 pounds.    [_] VERY HEAVY WORK: Lifting objects in excess of 100 pounds with frequent lifting and/or carrying of objects weighing 50 pounds or more.    [_] REGULAR DUTY: [_] No Restrictions. [_] With Restrictions (See comments below):    COMMENTS    CHRISSIE RETANA DO

## 2025-05-08 NOTE — PROGRESS NOTES
"  Subjective:       Patient ID: Hortensia Elena is a 54 y.o. female.  Chief Complaint   Patient presents with    Right Shoulder - Results      W/C Right Shoulder Dislocation, MRI results, patient had imaging done at [facility], reports still having moderate pain,        HPI:  History of Present Illness           Patient is seen today after her MRI.  She is here for discussion of next step evaluation.  Currently on restrictions at work.  She has a right rotator cuff tear.  No numbness no tingling no recent history of re dislocation    ROS:  Constitutional: Denies fever chills  Eyes: No change in vision  ENT: No ringing or current infections  CV: No chest pain  Resp: No labored breathing  MSK: Pain evident at site of injury located in HPI,   Integ: No signs of abrasions or lacerations  Neuro: No numbness or tingling  Lymphatic: No swelling outside the area of injury     Medications Ordered Prior to Encounter[1]       Objective:      /81   Pulse 78   Ht 5' 4" (1.626 m)   Wt 128.8 kg (283 lb 15.2 oz)   BMI 48.74 kg/m²   General the patient is alert and oriented x3 no acute distress nontoxic-appearing appropriate affect.    Constitutional: Vital signs are examined and stable.  Resp: No signs of labored breathing                RUE: -Skin:  Severe morbid obesity No signs of new abrasions or lacerations, no scars           -MSK: STR 5/5 AIN/PIN/Median/Radial/Ulnar motor, positive drop-arm with a right shoulder rotator cuff tear           -Neuro:  Sensation intact to light touch C5-T1 dermatomes           -Lymphatic: No signs of  lymphadenopathy,            -CV:  Capillary refill is less than 2 seconds. Radial and ulnar pulses 2/4. Compartments soft and compressible     Body mass index is 48.74 kg/m².  Ideal body weight: 54.7 kg (120 lb 9.5 oz)  Adjusted ideal body weight: 84.3 kg (185 lb 15 oz)  Hemoglobin A1c   Date Value Ref Range Status   09/09/2020 6.5 (H) 4.5 - 6.2 % Final   08/31/2019 6.4 (H) 4.5 - " 6.2 % Final     Hgb   Date Value Ref Range Status   04/14/2025 12.9 12.0 - 16.0 g/dL Final   11/24/2020 11.7 (L) 12.0 - 16.0 gm/dL Final     Vitamin D   Date Value Ref Range Status   09/09/2020 49.1 6.6 - 49.9 ng/mL Final   08/31/2019 84.83 (H) 30.00 - 80.00 ng/mL Final     WBC   Date Value Ref Range Status   04/14/2025 11.01 4.50 - 11.50 x10(3)/mcL Final   11/24/2020 7.2 4.5 - 11.5 x10(3)/mcL Final       Radiology: MRI reviewed showing multiple Supra/infra RCT, Large Hill sachs        Assessment:         1. Dislocation of right shoulder joint, initial encounter                  Plan:         No follow-ups on file.    There are no diagnoses linked to this encounter.    Assessment & Plan               Patient is a here with the MRI read.  Says that she has multiple rotator cuff torn with moderate retraction and a large Hill-Sachs lesion and inferior labral tear.  Patient will need to follow up with orthopedic surgeon that completes these types of repairs.  I have talked to my partner Dr. Dexter who is states they he will have the patient follow up with him for further management.  Continue restrictions as dictated.    This note/OR report was created with the assistance of  voice recognition software or phone  dictation.  There may be transcription errors as a result of using this technology however minimal. Effort has been made to assure accuracy of transcription but any obvious errors or omissions should be clarified with the author of the document.     This note was generated with the assistance of ambient listening technology. Verbal consent was obtained by the patient and accompanying visitor(s) for the recording of patient appointment to facilitate this note. I attest to having reviewed and edited the generated note for accuracy, though some syntax or spelling errors may persist. Please contact the author of this note for any clarification.       Cristino Stauffre DO  Orthopedic Trauma Surgery  05/08/2025      Future  Appointments   Date Time Provider Department Center   5/8/2025  8:45 AM Cristino Stauffer,  [facility]   5/15/2025  2:30 PM Abdiaziz Patel, [facility] [address]                   [1]   Current Outpatient Medications on File Prior to Visit   Medication Sig Dispense Refill    cetirizine (ZYRTEC) 10 MG tablet Take 10 mg by mouth once daily.      ergocalciferol (ERGOCALCIFEROL) 50,000 unit Cap Take 50,000 Units by mouth every 7 days.      hydroCHLOROthiazide (HYDRODIURIL) 25 MG tablet Take 25 mg by mouth.      ibuprofen (ADVIL,MOTRIN) 600 MG tablet Take 1 tablet (600 mg total) by mouth every 6 (six) hours as needed for Pain. 20 tablet 0    lisinopriL (PRINIVIL,ZESTRIL) 40 MG tablet Take 50 mg by mouth once daily.      losartan (COZAAR) 100 MG tablet Take 100 mg by mouth once daily.      meloxicam (MOBIC) 15 MG tablet Take 15 mg by mouth once daily at 6am.      MOUNJARO 15 mg/0.5 mL PnIj       ondansetron (ZOFRAN-ODT) 4 MG TbDL Take 1 tablet (4 mg total) by mouth every 6 (six) hours as needed (Nausea). 12 tablet 0    rosuvastatin (CRESTOR) 40 MG Tab Take 40 mg by mouth every evening.      tirzepatide (MOUNJARO) 10 mg/0.5 mL PnIj Inject 10 mg into the skin every 7 days.       No current facility-administered medications on file prior to visit.

## 2025-05-14 ENCOUNTER — HOSPITAL ENCOUNTER (OUTPATIENT)
Dept: CARDIOLOGY | Facility: HOSPITAL | Age: 55
Discharge: HOME | End: 2025-05-14
Attending: ORTHOPAEDIC SURGERY
Payer: OTHER MISCELLANEOUS

## 2025-05-14 ENCOUNTER — OFFICE VISIT (OUTPATIENT)
Dept: ORTHOPEDICS | Facility: CLINIC | Age: 55
End: 2025-05-14
Payer: OTHER MISCELLANEOUS

## 2025-05-14 DIAGNOSIS — Z01.818 PREOP TESTING: ICD-10-CM

## 2025-05-14 DIAGNOSIS — M75.121 COMPLETE TEAR OF RIGHT ROTATOR CUFF, UNSPECIFIED WHETHER TRAUMATIC: ICD-10-CM

## 2025-05-14 DIAGNOSIS — S43.004A DISLOCATION OF RIGHT SHOULDER JOINT, INITIAL ENCOUNTER: Primary | ICD-10-CM

## 2025-05-14 DIAGNOSIS — S43.431A LABRAL TEAR OF SHOULDER, RIGHT, INITIAL ENCOUNTER: ICD-10-CM

## 2025-05-14 DIAGNOSIS — S43.004A DISLOCATION OF RIGHT SHOULDER JOINT, INITIAL ENCOUNTER: ICD-10-CM

## 2025-05-14 PROCEDURE — 93005 ELECTROCARDIOGRAM TRACING: CPT

## 2025-05-14 RX ORDER — SODIUM CHLORIDE, SODIUM GLUCONATE, SODIUM ACETATE, POTASSIUM CHLORIDE AND MAGNESIUM CHLORIDE 30; 37; 368; 526; 502 MG/100ML; MG/100ML; MG/100ML; MG/100ML; MG/100ML
INJECTION, SOLUTION INTRAVENOUS CONTINUOUS
OUTPATIENT
Start: 2025-05-14

## 2025-05-15 ENCOUNTER — PATIENT MESSAGE (OUTPATIENT)
Dept: NEUROLOGY | Facility: CLINIC | Age: 55
End: 2025-05-15

## 2025-05-15 ENCOUNTER — OFFICE VISIT (OUTPATIENT)
Dept: NEUROLOGY | Facility: CLINIC | Age: 55
End: 2025-05-15

## 2025-05-15 DIAGNOSIS — G47.33 OSA ON CPAP: Primary | ICD-10-CM

## 2025-05-15 LAB
OHS QRS DURATION: 82 MS
OHS QTC CALCULATION: 434 MS

## 2025-05-15 NOTE — PROGRESS NOTES
Subjective:    CC: Shoulder Pain (R shoulder dislocation . Patient fell at work 4/14/25 and was referred by Dr Stauffer. Patient states shoulder is getting stiff but the pain has gotten better. MRI in chart. )       HPI:  Patient who comes in today complaining of right shoulder pain.  Patient states she fell at work, injuring her right shoulder.  Patient was seen in the ER, was noted to have a shoulder dislocation.  Patient states it was reduced, and has a subsequent dislocation again.  She did have outside x-rays as well as an MRI.  She denies any previous injuries, she denies other complaints.  She continues to have pain and loss of motion of her right shoulder.    ROS: Refer to HPI for pertinent ROS. All other 12 point systems negative.    Objective:  There were no vitals filed for this visit.     Physical Exam:  Right upper extremity compartments are soft and warm.  Skin is intact.  There is no signs or symptoms of DVT or infection.  She has some mild swelling about the right shoulder.  She is point tender along the anterolateral aspect of the right shoulder.  Positive apprehension negative sulcus.  She is able to forward flex and abduct 70° with pain and discomfort.  No obvious long track signs, neurovascular intact distally.    Images:  Outside MRI right shoulder was reviewed. Images Reviewed and discussed with patient.    Assessment:  1. Dislocation of right shoulder joint, initial encounter  - Place in Outpatient; Standing  - Vital signs; Standing  - Insert peripheral IV; Standing  - Clip and Prep Other (please specifiy) (Operative site); Standing  - Cleanse with Chlorhexidine (CHG); Standing  - Diet NPO; Standing  - electrolyte-A infusion  - IP VTE LOW RISK PATIENT; Standing  - Reason for no VTE Prophylaxis; Standing  - CBC auto differential; Future  - Comprehensive metabolic panel; Future  - EKG 12-lead; Future  - Inpatient consult to Anesthesiology; Standing  - Case Request Operating Room: ARTHROSCOPY,  SHOULDER, W/ DEBRIDEMENT, ARTHROSCOPY, SHOULDER, W/ SLAP REPAIR, REPAIR, ROTATOR CUFF  - Full code; Standing  - ceFAZolin (Ancef) 3 g in D5W 50 mL IVPB    2. Complete tear of right rotator cuff, unspecified whether traumatic  - Place in Outpatient; Standing  - Vital signs; Standing  - Insert peripheral IV; Standing  - Clip and Prep Other (please specifiy) (Operative site); Standing  - Cleanse with Chlorhexidine (CHG); Standing  - Diet NPO; Standing  - electrolyte-A infusion  - IP VTE LOW RISK PATIENT; Standing  - Reason for no VTE Prophylaxis; Standing  - CBC auto differential; Future  - Comprehensive metabolic panel; Future  - EKG 12-lead; Future  - Inpatient consult to Anesthesiology; Standing  - Case Request Operating Room: ARTHROSCOPY, SHOULDER, W/ DEBRIDEMENT, ARTHROSCOPY, SHOULDER, W/ SLAP REPAIR, REPAIR, ROTATOR CUFF  - Full code; Standing  - ceFAZolin (Ancef) 3 g in D5W 50 mL IVPB    3. Labral tear of shoulder, right, initial encounter  - Place in Outpatient; Standing  - Vital signs; Standing  - Insert peripheral IV; Standing  - Clip and Prep Other (please specifiy) (Operative site); Standing  - Cleanse with Chlorhexidine (CHG); Standing  - Diet NPO; Standing  - electrolyte-A infusion  - IP VTE LOW RISK PATIENT; Standing  - Reason for no VTE Prophylaxis; Standing  - CBC auto differential; Future  - Comprehensive metabolic panel; Future  - EKG 12-lead; Future  - Inpatient consult to Anesthesiology; Standing  - Case Request Operating Room: ARTHROSCOPY, SHOULDER, W/ DEBRIDEMENT, ARTHROSCOPY, SHOULDER, W/ SLAP REPAIR, REPAIR, ROTATOR CUFF  - Full code; Standing  - ceFAZolin (Ancef) 3 g in D5W 50 mL IVPB    4. Preop testing  - Place in Outpatient; Standing  - Vital signs; Standing  - Insert peripheral IV; Standing  - Clip and Prep Other (please specifiy) (Operative site); Standing  - Cleanse with Chlorhexidine (CHG); Standing  - Diet NPO; Standing  - electrolyte-A infusion  - IP VTE LOW RISK PATIENT; Standing  -  Reason for no VTE Prophylaxis; Standing  - CBC auto differential; Future  - Comprehensive metabolic panel; Future  - EKG 12-lead; Future  - Inpatient consult to Anesthesiology; Standing  - Case Request Operating Room: ARTHROSCOPY, SHOULDER, W/ DEBRIDEMENT, ARTHROSCOPY, SHOULDER, W/ SLAP REPAIR, REPAIR, ROTATOR CUFF  - Full code; Standing  - ceFAZolin (Ancef) 3 g in D5W 50 mL IVPB        Plan:  At this time we discussed her physical exam and outside imaging.  We have discussed her recurrent shoulder dislocation, full-thickness retracted rotator cuff tear as well.  Labral tear.  The risks benefits and alternatives were discussed with the patient in detail.  All questions were answered.  We have discussed the down time rehab process afterwards.  She would like to proceed with surgical intervention, we will set this up at her convenience.    Follow UP: No follow-ups on file.

## 2025-05-15 NOTE — PROGRESS NOTES
Virtual Visit Note    Subjective:          Patient ID: Hortensia Elena is a 54 y.o. female.    Chief Complaint: RICARDO    HPI:           The patient location is: home   Visit type: audiovisual      Cont to feel PAP therapy is beneficial; feels refreshed when awakening; denies EDS  Denies issues with mask/machine    PAP data:  date range 2025-2025  days used >=4hr   96.9%  AHI 1.1   CPAP 12 cm          2025   EPWORTH SLEEPINESS SCALE   Sitting and reading 1   Watching TV 1   Sitting, inactive in a public place (e.g. a theatre or a meeting) 0   As a passenger in a car for an hour without a break 0   Lying down to rest in the afternoon when circumstances permit 1   Sitting and talking to someone 0   Sitting quietly after a lunch without alcohol 0   In a car, while stopped for a few minutes in traffic 0   Total score 3         This is a telemedicine note. After obtaining verbal consent/patient identification using name and , patient was treated using real time audio/video, according to [facility] protocols. IAbdiaziz NP [distant provider], conducted the visit from location identified below. The patient participated in the visit at a non-[facility] location selected by the patient (or patients representative), identified below. I am licensed in the state where the patient stated they are located. The patient (or patients representative) stated that they understood and accepted the privacy and security risks to their information at their location.    Distant provider was located at [facility]    Each patient to whom he or she provides medical services by telemedicine is:  (1) informed of the relationship between the physician and patient and the respective role of any other health care provider with respect to management of the patient; and (2) notified that he or she may decline to receive medical services by telemedicine and may withdraw from such care at any time.           Past Medical History:   Diagnosis Date    COPD (chronic obstructive pulmonary disease)     Hypercholesteremia     Hypertension     Personal history of colonic polyps 12/19/2018    Rotator cuff tear     Sleep apnea     cpap       Past Surgical History:   Procedure Laterality Date    CHOLECYSTECTOMY      COLONOSCOPY  12/19/2018    Chris Petersen MD    COLONOSCOPY N/A 01/31/2023    Procedure: COLON;  Surgeon: Loi Meyer MD;  Location: [facility] ENDOSCOPY;  Service: Gastroenterology;  Laterality: N/A;    KNEE ARTHROSCOPY Right     POLYPECTOMY      TUBAL LIGATION         Family History   Problem Relation Name Age of Onset    Glaucoma Mother      Heart disease Mother      Hyperlipidemia Mother      Hypertension Mother      Stroke Father         Social History     Socioeconomic History    Marital status:    Tobacco Use    Smoking status: Former    Smokeless tobacco: Never   Substance and Sexual Activity    Alcohol use: Yes     Comment: beer occasionally    Drug use: Never    Sexual activity: Yes       Review of patient's allergies indicates:  No Known Allergies    Current Outpatient Medications   Medication Instructions    cetirizine (ZYRTEC) 10 mg, Daily    ergocalciferol (ERGOCALCIFEROL) 50,000 Units, Every 7 days    hydroCHLOROthiazide (HYDRODIURIL) 25 mg, Daily    ibuprofen (ADVIL,MOTRIN) 600 mg, Oral, Every 6 hours PRN    lisinopriL (PRINIVIL,ZESTRIL) 50 mg, Daily    losartan (COZAAR) 100 mg, Daily    meloxicam (MOBIC) 15 mg, As needed (PRN)    MOUNJARO 15 mg, Every 7 days    ondansetron (ZOFRAN-ODT) 4 mg, Oral, Every 6 hours PRN    rosuvastatin (CRESTOR) 40 mg, Nightly         Objective:      Physical Exam:  General- Patient alert and oriented x3 in NAD  Speech - nml  HEENT- EOMI  Resp- No increased WOB noted. Not using accessory muscles.  Skin-  No Jaundice. No visible skin lesions.        Assessment/Plan:     Problem List Items Addressed This Visit       RICARDO on CPAP - Primary    Patient is  benefiting from PAP therapy; Encouraged continued use of PAP. Drowsy driving may still occur despite PAP use. Clinical follow up and replacement of supplies discussed.    DME:Aries RODGERS in 1 year            Abdiaziz Patel, MSN, APRN, AGACNP-BC

## 2025-05-19 ENCOUNTER — ANESTHESIA EVENT (OUTPATIENT)
Dept: SURGERY | Facility: HOSPITAL | Age: 55
End: 2025-05-19
Payer: OTHER MISCELLANEOUS

## 2025-05-23 ENCOUNTER — ANESTHESIA (OUTPATIENT)
Dept: SURGERY | Facility: HOSPITAL | Age: 55
End: 2025-05-23
Payer: OTHER MISCELLANEOUS

## 2025-05-23 ENCOUNTER — HOSPITAL ENCOUNTER (OUTPATIENT)
Facility: HOSPITAL | Age: 55
Discharge: HOME | End: 2025-05-23
Attending: ORTHOPAEDIC SURGERY | Admitting: ORTHOPAEDIC SURGERY
Payer: OTHER MISCELLANEOUS

## 2025-05-23 VITALS
HEART RATE: 63 BPM | WEIGHT: 283.31 LBS | TEMPERATURE: 97 F | RESPIRATION RATE: 20 BRPM | BODY MASS INDEX: 50.2 KG/M2 | DIASTOLIC BLOOD PRESSURE: 54 MMHG | SYSTOLIC BLOOD PRESSURE: 104 MMHG | OXYGEN SATURATION: 94 % | HEIGHT: 63 IN

## 2025-05-23 DIAGNOSIS — M75.121 COMPLETE TEAR OF RIGHT ROTATOR CUFF, UNSPECIFIED WHETHER TRAUMATIC: Primary | ICD-10-CM

## 2025-05-23 DIAGNOSIS — S43.431A LABRAL TEAR OF SHOULDER, RIGHT, INITIAL ENCOUNTER: ICD-10-CM

## 2025-05-23 DIAGNOSIS — Z01.818 PREOP TESTING: ICD-10-CM

## 2025-05-23 DIAGNOSIS — S43.004A DISLOCATION OF RIGHT SHOULDER JOINT, INITIAL ENCOUNTER: ICD-10-CM

## 2025-05-23 PROCEDURE — 64415 NJX AA&/STRD BRCH PLXS IMG: CPT | Performed by: ANESTHESIOLOGY

## 2025-05-23 PROCEDURE — 63600175 PHARM REV CODE 636 W HCPCS: Performed by: NURSE ANESTHETIST, CERTIFIED REGISTERED

## 2025-05-23 PROCEDURE — 27201423 OPTIME MED/SURG SUP & DEVICES STERILE SUPPLY: Performed by: ORTHOPAEDIC SURGERY

## 2025-05-23 PROCEDURE — 37000008 HC ANESTHESIA 1ST 15 MINUTES: Performed by: ORTHOPAEDIC SURGERY

## 2025-05-23 PROCEDURE — C1713 ANCHOR/SCREW BN/BN,TIS/BN: HCPCS | Performed by: ORTHOPAEDIC SURGERY

## 2025-05-23 PROCEDURE — 63600175 PHARM REV CODE 636 W HCPCS: Performed by: ANESTHESIOLOGY

## 2025-05-23 PROCEDURE — 63600175 PHARM REV CODE 636 W HCPCS: Performed by: ORTHOPAEDIC SURGERY

## 2025-05-23 PROCEDURE — 25000003 PHARM REV CODE 250

## 2025-05-23 PROCEDURE — 71000033 HC RECOVERY, INTIAL HOUR: Performed by: ORTHOPAEDIC SURGERY

## 2025-05-23 PROCEDURE — 71000015 HC POSTOP RECOV 1ST HR: Performed by: ORTHOPAEDIC SURGERY

## 2025-05-23 PROCEDURE — 71000016 HC POSTOP RECOV ADDL HR: Performed by: ORTHOPAEDIC SURGERY

## 2025-05-23 PROCEDURE — C1889 IMPLANT/INSERT DEVICE, NOC: HCPCS | Performed by: ORTHOPAEDIC SURGERY

## 2025-05-23 PROCEDURE — 37000009 HC ANESTHESIA EA ADD 15 MINS: Performed by: ORTHOPAEDIC SURGERY

## 2025-05-23 PROCEDURE — 29806 SHO ARTHRS SRG CAPSULORRAPHY: CPT | Mod: RT,,, | Performed by: ORTHOPAEDIC SURGERY

## 2025-05-23 PROCEDURE — 25000003 PHARM REV CODE 250: Performed by: NURSE ANESTHETIST, CERTIFIED REGISTERED

## 2025-05-23 PROCEDURE — 29806 SHO ARTHRS SRG CAPSULORRAPHY: CPT | Mod: AS,RT,,

## 2025-05-23 PROCEDURE — 36000711: Performed by: ORTHOPAEDIC SURGERY

## 2025-05-23 PROCEDURE — 25000003 PHARM REV CODE 250: Performed by: ORTHOPAEDIC SURGERY

## 2025-05-23 PROCEDURE — 63600175 PHARM REV CODE 636 W HCPCS

## 2025-05-23 PROCEDURE — 25000003 PHARM REV CODE 250: Performed by: ANESTHESIOLOGY

## 2025-05-23 PROCEDURE — 36000710: Performed by: ORTHOPAEDIC SURGERY

## 2025-05-23 PROCEDURE — 23412 REPAIR ROTATOR CUFF CHRONIC: CPT | Mod: AS,51,RT,

## 2025-05-23 PROCEDURE — 23412 REPAIR ROTATOR CUFF CHRONIC: CPT | Mod: 51,RT,, | Performed by: ORTHOPAEDIC SURGERY

## 2025-05-23 DEVICE — IMPLANTABLE DEVICE
Type: IMPLANTABLE DEVICE | Site: SHOULDER | Status: FUNCTIONAL
Brand: BETTA LINK SR KNOTLESS SHOULDER IMPLANT KIT

## 2025-05-23 DEVICE — IMPLANTABLE DEVICE
Type: IMPLANTABLE DEVICE | Site: SHOULDER | Status: FUNCTIONAL
Brand: VENTIX™

## 2025-05-23 RX ORDER — PROPOFOL 10 MG/ML
VIAL (ML) INTRAVENOUS
Status: DISCONTINUED | OUTPATIENT
Start: 2025-05-23 | End: 2025-05-23

## 2025-05-23 RX ORDER — LIDOCAINE HYDROCHLORIDE 20 MG/ML
INJECTION, SOLUTION EPIDURAL; INFILTRATION; INTRACAUDAL; PERINEURAL
Status: DISCONTINUED | OUTPATIENT
Start: 2025-05-23 | End: 2025-05-23

## 2025-05-23 RX ORDER — FENTANYL CITRATE 50 UG/ML
INJECTION, SOLUTION INTRAMUSCULAR; INTRAVENOUS
Status: DISCONTINUED | OUTPATIENT
Start: 2025-05-23 | End: 2025-05-23

## 2025-05-23 RX ORDER — EPINEPHRINE 1 MG/ML
INJECTION, SOLUTION, CONCENTRATE INTRAVENOUS
Status: DISCONTINUED | OUTPATIENT
Start: 2025-05-23 | End: 2025-05-23 | Stop reason: HOSPADM

## 2025-05-23 RX ORDER — CALCIUM CARBONATE 200(500)MG
500 TABLET,CHEWABLE ORAL 3 TIMES DAILY PRN
Status: DISCONTINUED | OUTPATIENT
Start: 2025-05-23 | End: 2025-05-23 | Stop reason: HOSPADM

## 2025-05-23 RX ORDER — SODIUM CHLORIDE, SODIUM GLUCONATE, SODIUM ACETATE, POTASSIUM CHLORIDE AND MAGNESIUM CHLORIDE 30; 37; 368; 526; 502 MG/100ML; MG/100ML; MG/100ML; MG/100ML; MG/100ML
INJECTION, SOLUTION INTRAVENOUS CONTINUOUS
Status: DISCONTINUED | OUTPATIENT
Start: 2025-05-23 | End: 2025-05-23 | Stop reason: HOSPADM

## 2025-05-23 RX ORDER — ROPIVACAINE HYDROCHLORIDE 5 MG/ML
INJECTION, SOLUTION EPIDURAL; INFILTRATION; PERINEURAL
Status: COMPLETED
Start: 2025-05-23 | End: 2025-05-23

## 2025-05-23 RX ORDER — MORPHINE SULFATE 4 MG/ML
4 INJECTION, SOLUTION INTRAMUSCULAR; INTRAVENOUS
Refills: 0 | Status: DISCONTINUED | OUTPATIENT
Start: 2025-05-23 | End: 2025-05-23 | Stop reason: HOSPADM

## 2025-05-23 RX ORDER — GLUCAGON 1 MG
1 KIT INJECTION
Status: DISCONTINUED | OUTPATIENT
Start: 2025-05-23 | End: 2025-05-23 | Stop reason: HOSPADM

## 2025-05-23 RX ORDER — GABAPENTIN 300 MG/1
300 CAPSULE ORAL
Status: COMPLETED | OUTPATIENT
Start: 2025-05-23 | End: 2025-05-23

## 2025-05-23 RX ORDER — ALUMINUM HYDROXIDE, MAGNESIUM HYDROXIDE, AND SIMETHICONE 1200; 120; 1200 MG/30ML; MG/30ML; MG/30ML
30 SUSPENSION ORAL EVERY 6 HOURS PRN
Status: DISCONTINUED | OUTPATIENT
Start: 2025-05-23 | End: 2025-05-23 | Stop reason: HOSPADM

## 2025-05-23 RX ORDER — ROCURONIUM BROMIDE 10 MG/ML
INJECTION, SOLUTION INTRAVENOUS
Status: DISCONTINUED | OUTPATIENT
Start: 2025-05-23 | End: 2025-05-23

## 2025-05-23 RX ORDER — HYDROCODONE BITARTRATE AND ACETAMINOPHEN 5; 325 MG/1; MG/1
1 TABLET ORAL EVERY 4 HOURS PRN
Refills: 0 | Status: DISCONTINUED | OUTPATIENT
Start: 2025-05-23 | End: 2025-05-23 | Stop reason: HOSPADM

## 2025-05-23 RX ORDER — METHOCARBAMOL 500 MG/1
500 TABLET, FILM COATED ORAL EVERY 6 HOURS PRN
Status: DISCONTINUED | OUTPATIENT
Start: 2025-05-23 | End: 2025-05-23 | Stop reason: HOSPADM

## 2025-05-23 RX ORDER — MIDAZOLAM HYDROCHLORIDE 2 MG/2ML
2 INJECTION, SOLUTION INTRAMUSCULAR; INTRAVENOUS ONCE AS NEEDED
Status: COMPLETED | OUTPATIENT
Start: 2025-05-23 | End: 2025-05-23

## 2025-05-23 RX ORDER — MIDAZOLAM HYDROCHLORIDE 2 MG/2ML
INJECTION, SOLUTION INTRAMUSCULAR; INTRAVENOUS
Status: DISCONTINUED
Start: 2025-05-23 | End: 2025-05-23 | Stop reason: HOSPADM

## 2025-05-23 RX ORDER — HYDROMORPHONE HYDROCHLORIDE 2 MG/ML
0.4 INJECTION, SOLUTION INTRAMUSCULAR; INTRAVENOUS; SUBCUTANEOUS EVERY 5 MIN PRN
Status: DISCONTINUED | OUTPATIENT
Start: 2025-05-23 | End: 2025-05-23 | Stop reason: HOSPADM

## 2025-05-23 RX ORDER — EPINEPHRINE 1 MG/ML
INJECTION, SOLUTION, CONCENTRATE INTRAVENOUS
Status: DISCONTINUED
Start: 2025-05-23 | End: 2025-05-23 | Stop reason: HOSPADM

## 2025-05-23 RX ORDER — ONDANSETRON 4 MG/1
4 TABLET, ORALLY DISINTEGRATING ORAL ONCE
Status: COMPLETED | OUTPATIENT
Start: 2025-05-23 | End: 2025-05-23

## 2025-05-23 RX ORDER — DEXMEDETOMIDINE HYDROCHLORIDE 100 UG/ML
INJECTION, SOLUTION INTRAVENOUS
Status: DISCONTINUED | OUTPATIENT
Start: 2025-05-23 | End: 2025-05-23

## 2025-05-23 RX ORDER — ROPIVACAINE HYDROCHLORIDE 5 MG/ML
INJECTION, SOLUTION EPIDURAL; INFILTRATION; PERINEURAL
Status: DISCONTINUED | OUTPATIENT
Start: 2025-05-23 | End: 2025-05-23

## 2025-05-23 RX ORDER — ACETAMINOPHEN 500 MG
1000 TABLET ORAL
Status: COMPLETED | OUTPATIENT
Start: 2025-05-23 | End: 2025-05-23

## 2025-05-23 RX ORDER — ONDANSETRON HYDROCHLORIDE 2 MG/ML
INJECTION, SOLUTION INTRAVENOUS
Status: DISCONTINUED | OUTPATIENT
Start: 2025-05-23 | End: 2025-05-23

## 2025-05-23 RX ORDER — SUCCINYLCHOLINE CHLORIDE 20 MG/ML
INJECTION INTRAMUSCULAR; INTRAVENOUS
Status: DISCONTINUED | OUTPATIENT
Start: 2025-05-23 | End: 2025-05-23

## 2025-05-23 RX ORDER — DEXAMETHASONE SODIUM PHOSPHATE 4 MG/ML
INJECTION, SOLUTION INTRA-ARTICULAR; INTRALESIONAL; INTRAMUSCULAR; INTRAVENOUS; SOFT TISSUE
Status: DISCONTINUED | OUTPATIENT
Start: 2025-05-23 | End: 2025-05-23

## 2025-05-23 RX ORDER — LIDOCAINE HYDROCHLORIDE 10 MG/ML
1 INJECTION, SOLUTION EPIDURAL; INFILTRATION; INTRACAUDAL; PERINEURAL ONCE
Status: DISCONTINUED | OUTPATIENT
Start: 2025-05-23 | End: 2025-05-23 | Stop reason: HOSPADM

## 2025-05-23 RX ORDER — METHOCARBAMOL 100 MG/ML
1000 INJECTION, SOLUTION INTRAMUSCULAR; INTRAVENOUS ONCE
Status: COMPLETED | OUTPATIENT
Start: 2025-05-23 | End: 2025-05-23

## 2025-05-23 RX ORDER — PHENYLEPHRINE HYDROCHLORIDE 10 MG/ML
INJECTION INTRAVENOUS
Status: DISCONTINUED | OUTPATIENT
Start: 2025-05-23 | End: 2025-05-23

## 2025-05-23 RX ORDER — SEVOFLURANE 250 ML/250ML
LIQUID RESPIRATORY (INHALATION)
Status: DISCONTINUED
Start: 2025-05-23 | End: 2025-05-23 | Stop reason: HOSPADM

## 2025-05-23 RX ORDER — ONDANSETRON HYDROCHLORIDE 2 MG/ML
4 INJECTION, SOLUTION INTRAVENOUS EVERY 6 HOURS PRN
Status: DISCONTINUED | OUTPATIENT
Start: 2025-05-23 | End: 2025-05-23 | Stop reason: HOSPADM

## 2025-05-23 RX ORDER — ROPIVACAINE HYDROCHLORIDE 5 MG/ML
40 INJECTION, SOLUTION EPIDURAL; INFILTRATION; PERINEURAL ONCE
Status: DISCONTINUED | OUTPATIENT
Start: 2025-05-23 | End: 2025-05-23 | Stop reason: HOSPADM

## 2025-05-23 RX ORDER — METOCLOPRAMIDE HYDROCHLORIDE 5 MG/ML
10 INJECTION INTRAMUSCULAR; INTRAVENOUS EVERY 6 HOURS PRN
Status: DISCONTINUED | OUTPATIENT
Start: 2025-05-23 | End: 2025-05-23 | Stop reason: HOSPADM

## 2025-05-23 RX ORDER — HYDROCODONE BITARTRATE AND ACETAMINOPHEN 10; 325 MG/1; MG/1
1 TABLET ORAL EVERY 6 HOURS PRN
Qty: 28 TABLET | Refills: 0 | Status: SHIPPED | OUTPATIENT
Start: 2025-05-23 | End: 2025-05-30

## 2025-05-23 RX ORDER — PROCHLORPERAZINE EDISYLATE 5 MG/ML
5 INJECTION INTRAMUSCULAR; INTRAVENOUS EVERY 30 MIN PRN
Status: DISCONTINUED | OUTPATIENT
Start: 2025-05-23 | End: 2025-05-23 | Stop reason: HOSPADM

## 2025-05-23 RX ORDER — CELECOXIB 200 MG/1
200 CAPSULE ORAL
Status: COMPLETED | OUTPATIENT
Start: 2025-05-23 | End: 2025-05-23

## 2025-05-23 RX ORDER — SODIUM CHLORIDE 9 MG/ML
INJECTION, SOLUTION INTRAVENOUS CONTINUOUS
Status: DISCONTINUED | OUTPATIENT
Start: 2025-05-23 | End: 2025-05-23 | Stop reason: HOSPADM

## 2025-05-23 RX ADMIN — FENTANYL CITRATE 25 MCG: 50 INJECTION, SOLUTION INTRAMUSCULAR; INTRAVENOUS at 09:05

## 2025-05-23 RX ADMIN — CELECOXIB 200 MG: 200 CAPSULE ORAL at 07:05

## 2025-05-23 RX ADMIN — SODIUM CHLORIDE, SODIUM GLUCONATE, SODIUM ACETATE, POTASSIUM CHLORIDE AND MAGNESIUM CHLORIDE: 526; 502; 368; 37; 30 INJECTION, SOLUTION INTRAVENOUS at 08:05

## 2025-05-23 RX ADMIN — ACETAMINOPHEN 1000 MG: 500 TABLET ORAL at 07:05

## 2025-05-23 RX ADMIN — ONDANSETRON HYDROCHLORIDE 4 MG: 2 SOLUTION INTRAMUSCULAR; INTRAVENOUS at 09:05

## 2025-05-23 RX ADMIN — DEXMEDETOMIDINE HYDROCHLORIDE 4 MCG: 100 INJECTION, SOLUTION INTRAVENOUS at 09:05

## 2025-05-23 RX ADMIN — ONDANSETRON 4 MG: 4 TABLET, ORALLY DISINTEGRATING ORAL at 07:05

## 2025-05-23 RX ADMIN — ROPIVACAINE HYDROCHLORIDE 30 ML: 5 INJECTION, SOLUTION EPIDURAL; INFILTRATION; PERINEURAL at 07:05

## 2025-05-23 RX ADMIN — GABAPENTIN 300 MG: 300 CAPSULE ORAL at 07:05

## 2025-05-23 RX ADMIN — DEXMEDETOMIDINE HYDROCHLORIDE 6 MCG: 100 INJECTION, SOLUTION INTRAVENOUS at 09:05

## 2025-05-23 RX ADMIN — PHENYLEPHRINE HYDROCHLORIDE 100 MCG: 10 INJECTION INTRAVENOUS at 09:05

## 2025-05-23 RX ADMIN — LIDOCAINE HYDROCHLORIDE 4 ML: 20 INJECTION, SOLUTION EPIDURAL; INFILTRATION; INTRACAUDAL; PERINEURAL at 08:05

## 2025-05-23 RX ADMIN — FENTANYL CITRATE 50 MCG: 50 INJECTION, SOLUTION INTRAMUSCULAR; INTRAVENOUS at 08:05

## 2025-05-23 RX ADMIN — SUCCINYLCHOLINE CHLORIDE 120 MG: 20 INJECTION, SOLUTION INTRAMUSCULAR; INTRAVENOUS at 08:05

## 2025-05-23 RX ADMIN — DEXTROSE MONOHYDRATE 3 G: 5 INJECTION INTRAVENOUS at 08:05

## 2025-05-23 RX ADMIN — DEXAMETHASONE SODIUM PHOSPHATE 4 MG: 4 INJECTION, SOLUTION INTRA-ARTICULAR; INTRALESIONAL; INTRAMUSCULAR; INTRAVENOUS; SOFT TISSUE at 08:05

## 2025-05-23 RX ADMIN — MIDAZOLAM HYDROCHLORIDE 2 MG: 1 INJECTION, SOLUTION INTRAMUSCULAR; INTRAVENOUS at 07:05

## 2025-05-23 RX ADMIN — ROCURONIUM BROMIDE 45 MG: 10 SOLUTION INTRAVENOUS at 08:05

## 2025-05-23 RX ADMIN — HYDROMORPHONE HYDROCHLORIDE 0.4 MG: 2 INJECTION INTRAMUSCULAR; INTRAVENOUS; SUBCUTANEOUS at 10:05

## 2025-05-23 RX ADMIN — SUGAMMADEX 300 MG: 100 INJECTION, SOLUTION INTRAVENOUS at 09:05

## 2025-05-23 RX ADMIN — ROCURONIUM BROMIDE 5 MG: 10 SOLUTION INTRAVENOUS at 08:05

## 2025-05-23 RX ADMIN — PROPOFOL 150 MG: 10 INJECTION, EMULSION INTRAVENOUS at 08:05

## 2025-05-23 RX ADMIN — METHOCARBAMOL 1000 MG: 100 INJECTION INTRAMUSCULAR; INTRAVENOUS at 10:05

## 2025-05-23 NOTE — ANESTHESIA PROCEDURE NOTES
Peripheral Block    Patient location during procedure: pre-op   Block not for primary anesthetic.  Reason for block: at surgeon's request and post-op pain management   Post-op Pain Location: Right Shoulder   Start time: 5/23/2025 7:50 AM  Timeout: 5/23/2025 7:48 AM   End time: 5/23/2025 7:51 AM    Staffing  Authorizing Provider: Sigifredo Montero MD  Performing Provider: Sigifredo Montero MD    Staffing  Performed by: Sigifredo Montero MD  Authorized by: Sigifredo Montero MD    Preanesthetic Checklist  Completed: patient identified, IV checked, site marked, risks and benefits discussed, surgical consent, monitors and equipment checked, pre-op evaluation and timeout performed  Peripheral Block  Patient position: sitting  Prep: ChloraPrep  Patient monitoring: heart rate, cardiac monitor, continuous pulse ox and frequent blood pressure checks  Block type: supraclavicular  Laterality: right  Injection technique: single shot  Needle  Needle type: Stimuplex   Needle gauge: 20 G  Needle length: 4 in  Needle localization: anatomical landmarks, ultrasound guidance and nerve stimulator   -ultrasound image captured on disc.  Assessment  Injection assessment: negative aspiration, negative parasthesia and local visualized surrounding nerve  Paresthesia pain: none  Heart rate change: no  Slow fractionated injection: yes  Pain Tolerance: comfortable throughout block and no complaints  Medications:    Medications: ropivacaine (NAROPIN) injection 0.5% - Perineural   30 mL - 5/23/2025 7:50:00 AM    Additional Notes  VSS.  [facility] RN monitoring vitals throughout procedure. U/S Image revealed normal appearing supraclavicular anatomy. Continuously aspirated between incremental injections (HEME - neg). Appropriate neuro-stimulator twitch stopped @ 0.80 mA. Patient tolerated procedure well.

## 2025-05-23 NOTE — H&P
Admission History & Physical    Subjective:    CC: R shoulder pain     HPI:  Hortensia Elena presents today for preoperative evaluation for right shoulder arthroscopy, debridement, labral repair, mini open rotator cuff repair with subacromial decompression. I reviewed the indications for surgery. The risks and benefits of the proposed and alternative treatments were discussed with the patient. Questions pertinent to the procedure were solicited and answered. Dr. Dexter was available to answer any questions with instruction to call clinic with any further questions.No assurances were given. Informed consent was obtained. The patient expressed good understanding and wished to proceed with scheduling the procedure.       History of recurrent anterior dislocations.    ROS:   Constitutional: No fever, weakness, or fatigue.   Ear/Nose/Mouth/Throat: No nasal congestion or sore throat.   Respiratory: No shortness of breath or cough.   Cardiovascular: No chest pain, palpitations, or peripheral edema.   Gastrointestinal: No nausea, vomiting, or abdominal pain.   Genitourinary: No dysuria.  Musculoskeletal:  Right shoulder pain, swelling, loss of motion.    Past Surgical History:   Procedure Laterality Date    CHOLECYSTECTOMY      COLONOSCOPY  12/19/2018    Chris Petersen MD    COLONOSCOPY N/A 01/31/2023    Procedure: COLON;  Surgeon: Loi Meyer MD;  Location: [facility] ENDOSCOPY;  Service: Gastroenterology;  Laterality: N/A;    KNEE ARTHROSCOPY Right     POLYPECTOMY      TUBAL LIGATION          Past Medical History:   Diagnosis Date    COPD (chronic obstructive pulmonary disease)     Hypercholesteremia     Hypertension     Personal history of colonic polyps 12/19/2018    Rotator cuff tear     Sleep apnea     cpap        Objective:    Vitals:    05/23/25 0703   Resp: 20   Temp: 96 °F (35.6 °C)        Physical Exam:    Appearance: No distress, good color on room air. Alert and cooperative.  HEENT:  Normocephalic. PERRLA EOM intact.   Lungs: Breathing unlabored.  Heart: Regular rate and rhythm.  Abdomen: Soft, non-tender.  No rebound tenderness.  Extremities: Right upper extremity compartments are soft and warm. Skin is intact. There is no signs or symptoms of DVT or infection. She has some mild swelling about the right shoulder. She is point tender along the anterolateral aspect of the right shoulder. Positive apprehension negative sulcus. She is able to forward flex and abduct 70° with pain and discomfort. No obvious long track signs, neurovascular intact distally.   Skin: No rashes or open wounds.        Assessment:  Right shoulder dislocation, labral tear, rotator cuff tear    Plan:  Plan for right shoulder arthroscopy, debridement, labral repair, mini open rotator cuff repair with subacromial decompression at [facility]. The patient has been given preoperative instructions and prescriptions for post-operative medication. Post-operative appointment is scheduled for 2 weeks.

## 2025-05-23 NOTE — ANESTHESIA POSTPROCEDURE EVALUATION
Anesthesia Post Evaluation    Patient: Hortensia Elena    Procedure(s) Performed: Procedure(s) (LRB):  ARTHROSCOPY, SHOULDER, W/ DEBRIDEMENT block biomet (Right)  ARTHROSCOPY, SHOULDER, W/ SLAP REPAIR (Right)  REPAIR, ROTATOR CUFF (Right)    Final Anesthesia Type: general      Patient location during evaluation: OPS  Patient participation: Yes- Able to Participate  Level of consciousness: awake and oriented  Post-procedure vital signs: reviewed and stable  Pain management: adequate  Airway patency: patent    PONV status at discharge: No PONV  Anesthetic complications: no      Cardiovascular status: stable and hemodynamically stable  Respiratory status: unassisted and spontaneous ventilation  Hydration status: euvolemic  Follow-up not needed.          Neuro: stable peripheral nerve block    Vitals Value Taken Time   /54 05/23/25 11:46   Temp 36.3 °C (97.3 °F) 05/23/25 10:07   Pulse 66 05/23/25 11:59   Resp 18 05/23/25 11:15   SpO2 95 % 05/23/25 11:59   Vitals shown include unfiled device data.      Event Time   Out of Recovery 10:50:00         Pain/Ravi Score: Pain Rating Prior to Med Admin: 8 (5/23/2025 10:44 AM)  Ravi Score: 9 (5/23/2025 10:55 AM)  Modified Ravi Score: 16 (5/23/2025 10:55 AM)

## 2025-05-23 NOTE — ANESTHESIA PREPROCEDURE EVALUATION
05/22/2025  Hortensia Elena is a 54 y.o., female, who presents for the following:    Case: [id number] Date/Time: 05/23/25 0838   Procedures:      ARTHROSCOPY, SHOULDER, W/ DEBRIDEMENT block biomet (Right)      ARTHROSCOPY, SHOULDER, W/ SLAP REPAIR (Right)      REPAIR, ROTATOR CUFF (Right: Shoulder)   Anesthesia type: General   Diagnosis:      Dislocation of right shoulder joint, initial encounter [S43.004A]      Complete tear of right rotator cuff, unspecified whether traumatic [M75.121]      Labral tear of shoulder, right, initial encounter [S43.431A]      Preop testing [Z01.818]   Pre-op diagnosis:      Dislocation of right shoulder joint, initial encounter [S43.004A]      Complete tear of right rotator cuff, unspecified whether traumatic [M75.121]      Labral tear of shoulder, right, initial encounter [S43.431A]      Preop testing [Z01.818]   Location: [facility] OR [address] [place] [address] OR   Surgeons: Stevenson Dexter MD     LAB :  reviewed / acceptable    EKG:  NSR    Pre-op Assessment    I have reviewed the Patient Summary Reports.     I have reviewed the Nursing Notes. I have reviewed the NPO Status.   I have reviewed the Medications.     Review of Systems  Anesthesia Hx:  No problems with previous Anesthesia             Denies Family Hx of Anesthesia complications.    Denies Personal Hx of Anesthesia complications.                    Social:  Former Smoker       Cardiovascular:     Hypertension           hyperlipidemia                               Pulmonary:   COPD     Sleep Apnea, CPAP                Endocrine:        Morbid Obesity / BMI > 40      Physical Exam  General: Alert and Oriented  Morbid Obesity  Airway:  Mallampati: III / III  Mouth Opening: Normal  TM Distance: 4 - 6 cm  Tongue: Normal, Large  Neck ROM: Normal ROM  Neck: Girth Increased  Short neck  Dental:  Intact    Chest/Lungs:  Normal Respiratory  Rate    Heart:  Rate: Normal  Rhythm: Regular Rhythm        Anesthesia Plan  Type of Anesthesia, risks & benefits discussed:    Anesthesia Type: Gen ETT  Intra-op Monitoring Plan: Standard ASA Monitors  Post Op Pain Control Plan: multimodal analgesia and IV/PO Opioids PRN  Induction:  IV  Airway Plan: Direct and Video, Post-Induction  Informed Consent: Informed consent signed with the Patient and all parties understand the risks and agree with anesthesia plan.  All questions answered.   ASA Score: 3  Day of Surgery Review of History & Physical: H&P Update referred to the surgeon/provider.  Anesthesia Plan Notes: Premedication: Midazolam  Plan: GETA /  Supraclavicular Block for post-operative analgesia, per surgeon request  Special Technique: Preemptive analgesia with Neurontin, zofran, acetaminophen and celebrex    PONV prophylaxis with intraop zofran, decadron       Ready For Surgery From Anesthesia Perspective.     .

## 2025-05-23 NOTE — BRIEF OP NOTE
[facility] - Periop Services  Brief Operative Note    Surgery Date: 5/23/2025     Surgeons and Role:     * Stevenson Dexter MD - Primary    Assisting Surgeon: None    Pre-op Diagnosis:  Dislocation of right shoulder joint, initial encounter [S43.004A]  Complete tear of right rotator cuff, unspecified whether traumatic [M75.121]  Labral tear of shoulder, right, initial encounter [S43.431A]  Preop testing [Z01.818]    Post-op Diagnosis:  Post-Op Diagnosis Codes:     * Dislocation of right shoulder joint, initial encounter [S43.004A]     * Complete tear of right rotator cuff, unspecified whether traumatic [M75.121]     * Labral tear of shoulder, right, initial encounter [S43.431A]     * Preop testing [Z01.818]    Procedure(s) (LRB):  ARTHROSCOPY, SHOULDER, W/ DEBRIDEMENT block biomet (Right)  ARTHROSCOPY, SHOULDER, W/ SLAP REPAIR (Right)  REPAIR, ROTATOR CUFF (Right)    Anesthesia: General    Operative Findings: R shoulder scope, diana, labral repair, mo rtc sad    Estimated Blood Loss: * No values recorded between 5/23/2025  8:16 AM and 5/23/2025  9:55 AM *         Specimens:   Specimen (24h ago, onward)      None            * No specimens in log *        Discharge Note    OUTCOME: Patient tolerated treatment/procedure well without complication and is now ready for discharge.    DISPOSITION: Home or Self Care    FINAL DIAGNOSIS:  Complete tear of right rotator cuff    FOLLOWUP: In clinic    DISCHARGE INSTRUCTIONS:    Discharge Procedure Orders   Diet general     Ice to affected area     Lifting restrictions     No driving, operating heavy equipment or signing legal documents while taking pain medication.     Other restrictions (specify):   Order Comments: Ok to come out of sling for pendulum exercises, otherwise continue sling. No heavy lifting.     Remove dressing in 72 hours     Wound care routine (specify)   Order Comments: Wound care routine: keep dressing clean, dry, and intact. Ok to remove in  3 days and shower. No submersion.     Call MD for:  temperature >100.4     Call MD for:  persistent nausea and vomiting     Call MD for:  severe uncontrolled pain     Call MD for:  difficulty breathing, headache or visual disturbances     Call MD for:  redness, tenderness, or signs of infection (pain, swelling, redness, odor or green/yellow discharge around incision site)     Call MD for:  hives     Call MD for:  persistent dizziness or light-headedness     Call MD for:  extreme fatigue     Shower on day dressing removed (No bath)

## 2025-05-23 NOTE — TRANSFER OF CARE
"Anesthesia Transfer of Care Note    Patient: Hortensia Elena    Procedure(s) Performed: Procedure(s) (LRB):  ARTHROSCOPY, SHOULDER, W/ DEBRIDEMENT block biomet (Right)  ARTHROSCOPY, SHOULDER, W/ SLAP REPAIR (Right)  REPAIR, ROTATOR CUFF (Right)    Patient location: PACU    Anesthesia Type: general    Transport from OR: Transported from OR on room air with adequate spontaneous ventilation    Post pain: adequate analgesia    Post assessment: no apparent anesthetic complications and tolerated procedure well    Post vital signs: stable    Level of consciousness: awake and alert    Nausea/Vomiting: no nausea/vomiting    Complications: none    Transfer of care protocol was followed      Last vitals: Visit Vitals  BP (!) 108/56 (BP Location: Left arm, Patient Position: Lying)   Pulse 65   Temp 36.3 °C (97.3 °F)   Resp (!) 24   Ht 5' 3" (1.6 m)   Wt 128.5 kg (283 lb 4.7 oz)   SpO2 99%   Breastfeeding No   BMI 50.18 kg/m²     "

## 2025-05-23 NOTE — DISCHARGE INSTRUCTIONS
[facility] [place]CHAR[place] [address]  [phone]    DIET    YOUR FIRST MEAL SHOULD BE LIQUID: I.E. SOUPS, JELLO, JUICE. IF YOUR LIQUID MEAL IS TOLERATED WELL THEN YOU MAY PROGRESS TO A SMALL LIGHT MEAL.   IF NAUSEA AND VOMITING OCCUR RETURN TO THE LIQUID DIET AND PROGRESS TO A NORMAL SOLID DIET SLOWLY.  IF THE NAUSEA AND VOMITING DOES NOT STOP, NOTIFY YOUR HEALTH CARE PROVIDER.      GENERAL ANESTHESIA OR SEDATION    DO NOT DRIVE OR PARTICIPATE IN ANY ACTIVITIES THAT REQUIRE COORDINATION FOR THE NEXT 24 HOURS: I.E. SWIMMING, BIKING, OPERATING HEAVY MACHINERY, COOKING, USING POWER TOOLS, CLIMBING LADDERS.   FOR THE NEXT 24 HOURS DO NOT: DRIVE, DRINK ALCOHOL, MAKE ANY IMPORTANT DECISIONS OR SIGN ANY LEGAL DOCUMENTS.   STAY WITH AN ADULT DURING THE 24 HOURS AFTER YOUR SURGERY.   DRINK ENOUGH FLUIDS TO KEEP YOUR URINE CLEAR TO PALE YELLOW.      PREVENTING CONSTIPATION AFTER SURGERY    EAT FOOD HIGH IN FIBER AND DRINK PLENTY OF FLUIDS, ESPECIALLY WATER.   YOU MAY TAKE AN OVER THE COUNTER FIBER SUPPLEMENT OR STOOL SOFTENER AS DIRECTED ON THE PACKAGE.   STAYING MOBILE, IF POSSIBLE, HELPS TO PREVENT CONSTIPATION.  CONTACT YOUR DOCTOR IF YOU HAVE NOT HAD A BOWEL MOVEMENT IN 3 DAYS AFTER SURGERY.       INFECTION CONTROL    KEEP YOUR DRESSING CLEAN, DRY AND INTACT.   FOLLOW PHYSICIAN INSTRUCTIONS REGARDING REMOVAL OF DRESSING.  DO NOT TOUCH SURGICAL SITE OR APPLY LOTIONS, POWDERS, CREAMS OR OINTMENTS ON YOUR INCISION UNLESS INSTRUCTED TO DO SO BY YOUR HEALTH CARE PROVIDER.  ONCE THE DRESSING HAS BEEN REMOVED, CHECK THE INCISION SITE DAILY FOR: INCREASED REDNESS, SWELLING, FLUID OR BLOOD, WARMTH, PUS, FOUL SMELL OR INCREASED PAIN.      DEEP VEIN THROMBOSIS (DVT)    A DVT IS A BLOOD CLOT THAT CAN DEVELOP IN THE DEEP AND LARGER VEINS OF THE LEG, ARM OR PELVIS.   RISK FACTORS INCLUDE SITTING OR LYING FOR LONG PERIODS OF TIME. THIS INCLUDES RECOVERING FROM SURGERY.  PREVENTION INCLUDES:  AVOID SITTING STILL FOR LONG PERIODS WITHOUT MOVING YOUR LEGS, DO NOT SMOKE AND IF POSSIBLE AVOID MEDICATIONS THAT CONTAIN ESTROGEN.   SIGNS AND SYMPTOMS THAT SHOULD BE REPORTED IMMEDIATELY INCLUDE: SWELLING IN AN ARM OR LEG, WARMTH, REDNESS OR PAIN IN ONE AREA OF THE LEG OR ARM THAT DOES NOT COME FROM THE INCISION. IF THE CLOT IS IN YOUR LEG, THE SYMPTOMS WILL BE WORSE WHEN STANDING OR WALKING.   SIGNS OF A PULMONARY EMBOLISM OR PE (A CLOT THAT MOVED TO YOUR LUNG): SHORTNESS OF BREATH, COUGHING , ESPECIALLY IF ACCOMPANIED WITH BLOODY MUCUS, CHEST PAIN OR RAPID HEART RATE.  IF YOU EXPERIENCE THESE SYMPTOMS, YOU SHOULD GET EMERGENCY TREATMENT RIGHT AWAY. DO NOT WAIT TO SEE IF THESE SYMPTOMS WILL GO AWAY. DO NOT DRIVE YOURSELF TO THE HOSPITAL.       CONTACT YOUR HEALTH CARE PROVIDER IF:    YOU HAVE REDNESS, SWELLING OR PAIN AROUND YOUR INCISION.  YOUR INCISION FEELS WARM TO THE TOUCH OR IS LEAKING EXCESSIVE FLUID/ BLOOD.  YOUR SUTURES OR STAPLES HAVE COME UNDONE.  YOU HAVE A TEMPERATURE .5 OR GREATER.  YOU ARE NOT ABLE TO URINATE WITHIN 6 HOURS AFTER SURGERY.  YOU HAVE UNRESOLVED NAUSEA AND VOMITING.       SEEK IMMEDIATE MEDICAL CARE IF:    YOU HAVE PERSISTENT NAUSEA AND VOMITING.   YOU ARE UNABLE TO EAT OR DRINK.   YOU HAVE DIFFICULTY SPEAKING AND/ OR BREATHING.  YOUR SKIN COLOR APPEARS BLUE OR GRAY.  YOU HAVE A RED STREAK COMING FROM YOUR INCISION.  YOUR INCISION BLEEDS THROUGH THE DRESSING AND DOES NOT STOP WITH GENTLY PRESSURE.  YOU HAVE SEVERE PAIN THAT DOES NOT DECREASE WITH YOUR MEDICATIONS.

## 2025-05-27 NOTE — OP NOTE
DATE OF SURGERY:    5/23/2025     SURGEON:  Stevenson Dexter MD    ASSISTANT: ZAFAR Flannery    ASSISTANT ATTESTATION: PA was essential throughout key and critical portions of the case including soft tissue retraction, shoulder manipulation, implant fixation, as well as primary closure of multiple layered wound.    HOSPITAL:  [facility]     SERVICE:  Orthopedics      PREOPERATIVE DIAGNOSIS:  Right shoulder anterior labral tear, Bankart tear, full-thickness and retracted rotator cuff tear subacromial impingement      POSTOPERATIVE DIAGNOSIS:  Same as above.      PROCEDURE:  Right shoulder arthroscopy with debridement and anterior labral repair 2. Right shoulder mini open full-thickness and retracted rotator cuff repair 3. Right shoulder mini open subacromial decompression      ANESTHESIA:  LMA.      IV FLUIDS:  See Anesthesia.      ESTIMATED BLOOD LOSS:  Less than 20 cc.      COUNTS:  Correct.      COMPLICATIONS:  None.      IMPLANTS:    Implant Name Type Inv. Item Serial No.  Lot No. LRB No. Used Action   KIT BETTA LINK KNTLS SHLDR - [id number]  KIT BETTA LINK KNTLS SHLDR  [employer] [id number] Right 1 Implanted   ANCHOR VENTIX LNK KNTLS 4.75MM - [id number]  ANCHOR VENTIX LNK KNTLS 4.75MM  [employer] [id number] Right 1 Implanted   ANCHOR VENTIX LNK KNTLS 4.75MM - [id number]  ANCHOR VENTIX LNK KNTLS 4.75MM  [employer] [id number] Right 1 Implanted   ANCHOR VENTIX LNK KNTLS 4.75MM - [id number]  ANCHOR VENTIX LNK KNTLS 4.75MM  [employer] [id number] Right 1 Implanted         DISPOSITION:  Stable to PACU.      INDICATIONS FOR PROCEDURE: Hortensia Elena is a 54 y.o. old female  with continued pain and loss of motion of the right shoulder.  The patient has failed multiple conservative treatments. Risks, benefits, and alternatives discussed with the patient as well as patient's family in detail.  All questions were answered.  Informed consent was obtained.      PROCEDURE IN DETAIL: The patient was found in  preoperative holding by Anesthesia and found fit for surgery.  Patient was taken to the operating room and placed on the operating table in supine position.  All bony prominences were well padded.  Time-out was called to identify correct patient, correct procedure, and correct site, and all were in agreement.  The patient underwent LMA anesthesia without complications.  The patient was then prepped and draped in normal sterile fashion, leaving the right upper extremity exposed for surgery.  The patient was in the modified beach chair position.  Preoperative antibiotics wer given. Next, through the posterior portal with good backflow, a 1 cm incision was made.  A camera was introduced into the glenohumeral joint.  After this was done, the anterior portal was then made through an incision just lateral to the tip of the coracoid.  Next, examination of the glenohumeral joint demonstrated a large tear of the anterior labrum.  The complete labrum was in the recess itself, she did have tearing of the anterior cartilage as well.  The humeral head was unstable to stressing, given this we will tear was mobilized, freed up and repaired back down to the anterior lip of the glenoid.  This was done with a suture anchor.  Inferior recess was inspected, no loose bodies.  The biceps tendon and anchor looked appropriate otherwise.  Attention was turned laterally where patient was noted to have a massive retracted rotator cuff tear given this a mini open 3 cm incision made over the anterolateral aspect of the right shoulder soft tissue dissection was taken down to the fascia were split in line with its fibers.  Next the subacromial space was opened, large amount of bursal tissue was removed, she did have scraping of the undersurface of the acromion.  Next after the subacromial decompression the rotator cuff was found in the posterior aspect of the shoulder with use of Allis clamp and Key elevator it was brought back over the humeral  head.  With use of 3 anchors and 6 sutures, he had a cuff was repaired back down over the humeral head.  After this was done, hemostasis was achieved.  Copious irrigation was used to wash the wound.  The fascia was closed with 0 Vicryl, subcutaneous tissues closed with 2-0 Vicryl, skin was closed with 3-0 nylon suture.  Xeroform, 4 x 4's, soft tissue dressing, and a shoulder abduction sling were placed on the affected upper extremity.  The patient was awoken by Anesthesia and brought to PACU in stable condition.    ______________________________  Stevenson Dexter MD

## 2025-06-09 ENCOUNTER — OFFICE VISIT (OUTPATIENT)
Dept: ORTHOPEDICS | Facility: CLINIC | Age: 55
End: 2025-06-09
Payer: COMMERCIAL

## 2025-06-09 VITALS
DIASTOLIC BLOOD PRESSURE: 82 MMHG | WEIGHT: 283.31 LBS | HEART RATE: 68 BPM | HEIGHT: 63 IN | SYSTOLIC BLOOD PRESSURE: 150 MMHG | BODY MASS INDEX: 50.2 KG/M2

## 2025-06-09 DIAGNOSIS — M75.121 COMPLETE TEAR OF RIGHT ROTATOR CUFF, UNSPECIFIED WHETHER TRAUMATIC: ICD-10-CM

## 2025-06-09 DIAGNOSIS — S43.431A LABRAL TEAR OF SHOULDER, RIGHT, INITIAL ENCOUNTER: ICD-10-CM

## 2025-06-09 DIAGNOSIS — S43.004A DISLOCATION OF RIGHT SHOULDER JOINT, INITIAL ENCOUNTER: Primary | ICD-10-CM

## 2025-06-09 PROCEDURE — 99024 POSTOP FOLLOW-UP VISIT: CPT | Mod: ,,,

## 2025-06-09 NOTE — PROGRESS NOTES
Subjective:    CC: Post-op Evaluation (PO RTC repar 5/23/25. Patient states she iced for a few days after surgery and has very little pain in shoulder. Patient stopped pain meds after three days post surgery is now taking Tylenol. )       HPI:  Patient returns to clinic for post op visit. Status post right shoulder labral repair, rotator cuff repair on 05/23/2025; history of dislocation. Approximately 2 weeks out. The patients pain is overall much improved.  Managed with Tylenol Arthritis only at this time.  She states she only needed narcotic pain medicine a few days after surgery. The patient states no signs of infection. Patient presents today wearing a simple sling. No new complaints.    ROS: Refer to HPI for pertinent ROS. All other 12 point systems negative.    Objective:    Vitals:    06/09/25 1412   BP: (!) 150/82   Pulse: 68        Physical Exam:  Right upper extremity compartments are soft and warm. There are no signs or symptoms of DVT or infection. Incision sites are well healed, clean, and dry.  Stitches removed in clinic today.  Appropriately tender to palpation about lateral shoulder. Passive range of motion shows that the patient has 105 degrees of abduction and forward flexion; 50° active ROM. Neurovascularly intact distally.    Images:  Previous Images Reviewed and discussed with patient.    Assessment:  1. Dislocation of right shoulder joint, initial encounter  - Ambulatory Referral/Consult to Physical Therapy; Future    2. Complete tear of right rotator cuff, unspecified whether traumatic  - Ambulatory Referral/Consult to Physical Therapy; Future    3. Labral tear of shoulder, right, initial encounter  - Ambulatory Referral/Consult to Physical Therapy; Future       Plan:  Physical exam and intraoperative findings discussed with the patient. Wound care instructions given.  She will discontinue sling use.  Prescription for formal physical therapy given to start in 2 weeks.  In the meantime  continue pendulum exercises, we have discussed some gentle range-of-motion exercises for at home. The Patient was instructed to take OTC pain medication as needed with appropriate precautions and to refrain from heavy lifting.  We have discussed her work duties.  She will remain off duty at this time.  I would like to see the patient back in 4 weeks to assess the patients progress.    Follow up: Follow up in about 4 weeks (around 7/7/2025).

## 2025-06-09 NOTE — LETTER
[facility]  [address] [place] [address] [id number] [place], [address]  Phone: [phone]  Fax: [phone]    Name:Hortensia Elena  :1970   Date:2025     PATIENT IS UNABLE TO WORK AS OF: 25  [_] Pending treatment.  [x] For approximately [_] Days [4] Weeks [_] Months  [_] Pending diagnostic testing.  [_] Pending surgical treatment.  [_] For approximately _ months (Post Surgery)    PATIENT IS ABLE TO RETURN TO WORK AS OF:re-eval at next apt on 25      COMMENTS     Stevenson Dexter MD / Arabella Ivory PA-C

## 2025-06-25 ENCOUNTER — TELEPHONE (OUTPATIENT)
Dept: ORTHOPEDICS | Facility: CLINIC | Age: 55
End: 2025-06-25
Payer: COMMERCIAL

## 2025-06-25 NOTE — TELEPHONE ENCOUNTER
Whitley pizarro [facility] called in regards to patient's physical therapy.     Initial 1010 for PT needs to come from us. And any additional PT will come from the PT location.     Sent 1010 with medical records and PT order to [phone]

## 2025-06-25 NOTE — TELEPHONE ENCOUNTER
Patient called stating that she needed a paper for workers comp to approve her PT.     Called patient and advised that generally once the order is at the location of her choosing, they will fill out the 1010 since we do not have the therapy codes.     Pt stated that she will give her workers comp a call to let them know.     Pt verbalized understanding and will call with any questions or concerns.

## 2025-07-07 ENCOUNTER — OFFICE VISIT (OUTPATIENT)
Dept: ORTHOPEDICS | Facility: CLINIC | Age: 55
End: 2025-07-07
Payer: OTHER MISCELLANEOUS

## 2025-07-07 VITALS
HEART RATE: 73 BPM | WEIGHT: 283.31 LBS | BODY MASS INDEX: 50.2 KG/M2 | DIASTOLIC BLOOD PRESSURE: 64 MMHG | SYSTOLIC BLOOD PRESSURE: 124 MMHG | HEIGHT: 63 IN

## 2025-07-07 DIAGNOSIS — M75.121 COMPLETE TEAR OF RIGHT ROTATOR CUFF, UNSPECIFIED WHETHER TRAUMATIC: ICD-10-CM

## 2025-07-07 DIAGNOSIS — S43.004A DISLOCATION OF RIGHT SHOULDER JOINT, INITIAL ENCOUNTER: Primary | ICD-10-CM

## 2025-07-14 NOTE — PROGRESS NOTES
"Subjective:    CC: Follow-up of the Right Shoulder (F/U R RTC repair sx 5/23/25. Pt states shoulder is doing better. Has pain in mornings when she wakes up. Controlling pain with tylenol arthritis and it gives relief. ROM is good but can't fully raise it, progressively getting better. Started PT last week and going well. No other concerns with it.)       HPI:  Patient returns today for repeat exam.  Patient is 6 weeks from her right shoulder surgery.  She states she is doing very well.    ROS: Refer to HPI for pertinent ROS. All other 12 point systems negative.    Objective:  Vitals:    07/07/25 1311   BP: 124/64   BP Location: Left arm   Patient Position: Sitting   Pulse: 73   Weight: 128.5 kg (283 lb 4.7 oz)   Height: 5' 3" (1.6 m)        Physical Exam:  Right upper extremity compartment soft and warm.  Skin is intact.  There is no signs or symptoms of DVT or infection.  Incisions are healed she is able to forward flex and abduct to 105° stable to stressing no instability negative subluxation, neurovascular intact distally.    Images: . Images Reviewed and discussed with patient.    Assessment:  1. Dislocation of right shoulder joint, initial encounter  - Ambulatory Referral/Consult to Physical Therapy; Future    2. Complete tear of right rotator cuff, unspecified whether traumatic  - Ambulatory Referral/Consult to Physical Therapy; Future        Plan:  At this time we discussed her physical exam and intraoperative findings.  She is healing nicely she will continue to wean out of her sling, we have discussed continuing physical therapy to regain her strength and motion.  She will remain away from the positions of apprehension, I would like see her back 4 weeks repeat exam.    Follow UP: No follow-ups on file.              "

## 2025-08-18 ENCOUNTER — OFFICE VISIT (OUTPATIENT)
Dept: ORTHOPEDICS | Facility: CLINIC | Age: 55
End: 2025-08-18
Payer: OTHER MISCELLANEOUS

## 2025-08-18 DIAGNOSIS — S43.004A DISLOCATION OF RIGHT SHOULDER JOINT, INITIAL ENCOUNTER: Primary | ICD-10-CM

## 2025-08-18 DIAGNOSIS — S43.431A LABRAL TEAR OF SHOULDER, RIGHT, INITIAL ENCOUNTER: ICD-10-CM

## 2025-08-18 DIAGNOSIS — M75.121 COMPLETE TEAR OF RIGHT ROTATOR CUFF, UNSPECIFIED WHETHER TRAUMATIC: ICD-10-CM

## 2025-08-18 PROCEDURE — 99024 POSTOP FOLLOW-UP VISIT: CPT | Mod: ,,, | Performed by: ORTHOPAEDIC SURGERY

## (undated) DEVICE — COLLECTION SPECIMEN NEPTUNE

## (undated) DEVICE — GLOVE SENSICARE PI GRN 8.5

## (undated) DEVICE — DRAPE U STD FILM LG 60X84IN

## (undated) DEVICE — GLOVE SENSICARE PI GRN 6.5

## (undated) DEVICE — GLOVE SENSICARE PI MICRO 8

## (undated) DEVICE — KIT CANIST SUCTION 1200CC

## (undated) DEVICE — GOWN POLY REINF X-LONG 2XL

## (undated) DEVICE — APPLICATOR CHLORAPREP ORN 26ML

## (undated) DEVICE — DRESSING XEROFORM NONADH 1X8IN

## (undated) DEVICE — SLING SHOT II LARGE

## (undated) DEVICE — TIP SUCTION YANKAUER

## (undated) DEVICE — TUBE SET INFLOW/OUTFLOW

## (undated) DEVICE — NDL ANES SPINAL 18X3.5ST 18G

## (undated) DEVICE — SUT 3-0 MONOCRYL PLUS PS-2

## (undated) DEVICE — KIT SURGICAL TURNOVER

## (undated) DEVICE — UNDERPAD DISPOSABLE 30X30IN

## (undated) DEVICE — BLADE SHAVER GREAT WHITE 3.5MM

## (undated) DEVICE — SOL NACL IRR 3000ML

## (undated) DEVICE — TAPE ADH MEDIPORE 4 X 10YDS

## (undated) DEVICE — SPONGE COTTON TRAY 4X4IN

## (undated) DEVICE — SYR 50CC LL

## (undated) DEVICE — TUBE SUCTION MEDI-VAC STERILE

## (undated) DEVICE — PAD ABDOMINAL STERILE 8X10IN

## (undated) DEVICE — SUT MONOCRYL 3-0 PS-2 UND

## (undated) DEVICE — SUT 3-0 ETHILON 18 FS-1

## (undated) DEVICE — GLOVE SIGNATURE ESSNTL LTX 6.5

## (undated) DEVICE — KIT SURGICAL COLON .25 1.1OZ

## (undated) DEVICE — SOL IRRI STRL WATER 1000ML

## (undated) DEVICE — STRIP MEDI WND CLSR 1/2X4IN

## (undated) DEVICE — RESTRAINT HEAD BCH CHR W/ CLIP

## (undated) DEVICE — DRAPE STERI U-SHAPED 47X51IN

## (undated) DEVICE — BLOCK BLOX BITE DENT RIM 54FR

## (undated) DEVICE — COVER MAYO STND XL 30X57IN

## (undated) DEVICE — Device

## (undated) DEVICE — SUT VICRYL PLUS 2-0 CT1 18

## (undated) DEVICE — BLADE VORTEX SHAVER 4.5MMX13CM

## (undated) DEVICE — PASSER QUATTRO SUT GT NDL

## (undated) DEVICE — BLADE SURG CARBON STEEL SZ11

## (undated) DEVICE — ADAPTER DUAL NSL LUER M-M 7FT

## (undated) DEVICE — SUT VICRYL PLUS 0 CT1 18IN

## (undated) DEVICE — STOCKINET 4INX48

## (undated) DEVICE — TAPE BROADBAND TWO PACK 1.5MM

## (undated) DEVICE — ELECTRODE PATIENT RETURN DISP

## (undated) DEVICE — DRAPE SHOULDER BEACH CHAIR